# Patient Record
Sex: MALE | Race: WHITE | NOT HISPANIC OR LATINO | URBAN - METROPOLITAN AREA
[De-identification: names, ages, dates, MRNs, and addresses within clinical notes are randomized per-mention and may not be internally consistent; named-entity substitution may affect disease eponyms.]

---

## 2018-09-27 ENCOUNTER — DOCUMENTATION ONLY (OUTPATIENT)
Dept: ENDOCRINOLOGY | Facility: CLINIC | Age: 33
End: 2018-09-27

## 2018-09-27 ENCOUNTER — OFFICE VISIT (OUTPATIENT)
Dept: ENDOCRINOLOGY | Facility: CLINIC | Age: 33
End: 2018-09-27
Payer: COMMERCIAL

## 2018-09-27 VITALS
WEIGHT: 171.2 LBS | DIASTOLIC BLOOD PRESSURE: 85 MMHG | SYSTOLIC BLOOD PRESSURE: 122 MMHG | BODY MASS INDEX: 24.56 KG/M2 | HEART RATE: 75 BPM

## 2018-09-27 DIAGNOSIS — E72.4 OTC (ORNITHINE TRANSCARBAMYLASE DEFICIENCY) (H): Primary | ICD-10-CM

## 2018-09-27 DIAGNOSIS — E72.4 OTC (ORNITHINE TRANSCARBAMYLASE DEFICIENCY) (H): ICD-10-CM

## 2018-09-27 LAB
ALBUMIN SERPL-MCNC: 4 G/DL (ref 3.4–5)
ALP SERPL-CCNC: 123 U/L (ref 40–150)
ALT SERPL W P-5'-P-CCNC: 54 U/L (ref 0–70)
AST SERPL W P-5'-P-CCNC: 9 U/L (ref 0–45)
BILIRUB DIRECT SERPL-MCNC: 0.2 MG/DL (ref 0–0.2)
BILIRUB SERPL-MCNC: 0.4 MG/DL (ref 0.2–1.3)
PREALB SERPL IA-MCNC: 32 MG/DL (ref 15–45)
PROT SERPL-MCNC: 8 G/DL (ref 6.8–8.8)
TRANSFERRIN SERPL-MCNC: 300 MG/DL (ref 210–360)

## 2018-09-27 PROCEDURE — 84466 ASSAY OF TRANSFERRIN: CPT | Performed by: MEDICAL GENETICS

## 2018-09-27 PROCEDURE — 83921 ORGANIC ACID SINGLE QUANT: CPT | Performed by: MEDICAL GENETICS

## 2018-09-27 PROCEDURE — 82139 AMINO ACIDS QUAN 6 OR MORE: CPT | Performed by: MEDICAL GENETICS

## 2018-09-27 PROCEDURE — 82306 VITAMIN D 25 HYDROXY: CPT | Performed by: MEDICAL GENETICS

## 2018-09-27 RX ORDER — FAMOTIDINE 40 MG/1
40 TABLET, FILM COATED ORAL 2 TIMES DAILY
COMMUNITY
Start: 2018-05-14

## 2018-09-27 ASSESSMENT — PAIN SCALES - GENERAL: PAINLEVEL: NO PAIN (0)

## 2018-09-27 NOTE — PROGRESS NOTES
Adult Metabolism Clinic Return Visit  Name:  Tory Stone  :   1985  MRN:   [id number]  Date of Visit: Sep 27, 2018  PCP: Sterling Pereira  Managing Metabolic Center(s):  [facility] Adult Metabolism [facility].    Chief Complaint:  Mr. Tory Stone is a 33 year old male whom I saw in follow up in Metabolism Clinic for OTC deficiencyfiance.  Mr. 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 Fortunately, he has not had an episode of significant metabolic decompensation for many years. Nonetheless, he remains at risk for hyperammonemia should he fall seriously ill or have significant physical stress. For this reason, he should make sure that his medical providers are aware of his condition and keep a copy of his emergency letter should symptoms ensue.     His lab tests suggest against protein insufficiency with likely adequate protein intake on his current diet.     Plan:    1. Testing ordered at this visit:   Orders Placed This Encounter   Procedures     Amino acids plasma quantitative     Prealbumin     Transferrin     Vitamin D Deficiency     Orotic acid urine     Hepatic panel   .    Results are as noted, below. Additional recommendations based on these laboratory results:  Glutamine was normal suggesting no chronic hyperammonemia is present. Hepatic proteins as a measure of protein intake adequacy showed a normal profile of proteins suggesting general protein sufficiency.  Vitamin D was normal. Liver enzyme tests were normal. Urine orotic acid was normal    2. Medications:  No current medical treatments are required for ongoing management of Hu's OTC deficiency at this time.  3. Continue to observe emergency precautions as previously discussed.  Our on-call metabolic service is available 24 hours/day by calling the page  ([phone]) and asking for the Genetics and Metabolism doctor on  "call.  4. Return to the Adult Metabolism Clinic in 12 months for follow-up.     5.   May try Zyrtec or Benadryl for itching  6.  I recommended that if he experiences a similar \"dazed\" episode that he get his ammonia checked.  ----------  History of Present Illness:  Visit Diagnosis:  OTC (ornithine transcarbamylase deficiency) (H)    Patient Active Problem List   Diagnosis     OTC (ornithine transcarbamylase deficiency) - see EMERGENCY protocol in letters tab dated 5/14/14     Need for immunization against influenza     Discussed at this visit:   Hu reported a single episode when he felt \"very out of it\" or dazed. He did not seek particular medical attention at that time and this remitted without intervention. On the day of the visit, he was having some upper respiratory symptoms and some pain in his right ear.    Nutrition History:  He describes himself as \"going with his gut\" with regard to his diet. He does not formally restrict protein intake. He avoids muscle meat but eats occasional fish and sparingly eats eggs. He drinks some milk and eats some cheese..      Interval History:  Tory was last seen in our clinic on  1/25/16.  Since the last clinic visit Tory was not seen for any urgent clinic visits.  He was not seen in the emergency department.  He currently has a written emergency letter for this condition.  No hospitalizations occurred.  He had no general anesthesia and no surgical procedures.      Other health services currently received are primary care    Personal History:  Family History Update:  There was no new family history information elicited at this visit.    Lives with flora. His mother has moved to [place]. He is unable to work because of his chronic fatigue. He indicated to me that he recently has been working with a new  to obtain some additional support.    Current insurance status state/federal program (Medicaid/Medicare).      I have reviewed Tory s past " "medical history, family history, social history, medications and allergies as documented in the patient's electronic medical record.  There were no additional findings except as noted.     Review of Systems:   Constitional: chronic fatigue, sleeps 10-13 hours a day  Eyes: negative -  Wears glasses as he is nearsighted   Ears/Nose/Throat: negative - normal hearing  Respiratory:  Tires easily with exertion and gets \"winded\"  Cardiovascular: negative  Gastrointestinal:  Has some problems with reflux and occasionally takes antacids; does not have constipation  Genitourinary:  Has had kidney stones that required lithotripsy; he indicates that occasionally he has aching in the kidney area  Hematologic/Lymphatic: negative  Allergy/Immunologic: negative - no drug allergies  Musculoskeletal: negative  Endocrine: negative  Integument: negative  Neurologic:  Has recurrent migraines; also notes some hypersensitivity to touch at certain times. He treats things with over-the-counter NSAIDs. These occur a few times a month.  Psychiatric:  Anxiety    Medications:  Current Outpatient Prescriptions   Medication Sig Dispense Refill     famotidine (PEPCID) 40 MG tablet Take 40 mg by mouth 2 times daily       Multiple Vitamins-Minerals (MULTIVITAMIN PO) Take 1 tablet by mouth daily        Allergies:  Allergies   Allergen Reactions     Sulfa Drugs      Physical Examination:  Blood pressure 122/85, pulse 75, weight 77.7 kg (171 lb 3.2 oz).  Body surface area is 1.96 meters squared.    General: This was a well-developed, well-nourished male who responded appropriately to all requests during the examination.    Head and Neck:  He had hair of normal texture and distribution and his head was proportionate in appearance.The neck was supple without lymphadenopathy.    Eyes:  The pupils were equal, round, and reacted to light.   The conjunctivae were clear.   Ears:  His ears were normal in architecture and placement.   Nose: The nose was clear.  "   Mouth and Throat: his dentition was normal.  The throat was without erythema.    Respiratory: The chest was clear to auscultation and had a symmetric appearance.    CV:  On examination of the heart, the rhythm was regular and there was no murmur.  The peripheral pulses were normal.    GI: The abdomen was soft and had normal bowel sounds.  There was no hepatosplenomegaly.    : I deferred a  examination.   Musculoskeletal: There was a full range of motion on the extremity exam, and normal muscular volume and bulk.   Neurologic: The neurologic exam was normal, with normal cranial nerves, normal deep tendon reflexes, normal sensation, and a normal gait. He had normal tone.   Integument: The skin was normal with no rashes or unusual pigmentation.    Results of laboratory studies collected at this visit and available when this note was completed:   Orders Only on 09/27/2018   Component Date Value Ref Range Status     A-Aminoadipic 09/27/2018 Negative  0 - 6 umol/dL Final     Alanine 09/27/2018 44  22 - 62 umol/dL Final     Anserine 09/27/2018 Negative  umol/dL Final     Arginine 09/27/2018 6  2 - 18 umol/dL Final     Asparagine 09/27/2018 5  1 - 5 umol/dL Final     Aspartic Acid 09/27/2018 <1  0 - 4 umol/dL Final     B-Alanine Plasma 09/27/2018 Negative  umol/dL Final     B-Aminoisobutyric 09/27/2018 Negative  umol/dL Final     Carnosine 09/27/2018 Negative  umol/dL Final     Citrulline 09/27/2018 2.1  1.3 - 6.0 umol/dL Final    Comment: Citrulline Reference Ranges in umol/dL:  0-4 months              0.7-4.1  4 months-12 years       1.0-5.0  >12 years               1.3-6.0       Cystathionine 09/27/2018 Negative  umol/dL Final     Cystine 09/27/2018 8  3 - 15 umol/dL Final     Glutamic Acid 09/27/2018 5  0 - 16 umol/dL Final     Glutamine 09/27/2018 64  41 - 86 umol/dL Final     Glycine 09/27/2018 25  13 - 50 umol/dL Final     Histidine 09/27/2018 7  3 - 15 umol/dL Final     1-Methylhistidine 09/27/2018 3* 0 - 2  umol/dL Final     3-Methylhistidine 09/27/2018 <1  0 - 1 umol/dL Final     Homocysteine umol/dL 09/27/2018 Negative  umol/dL Final     Hydroxylysine 09/27/2018 Negative  umol/dL Final     Hydroxyproline 09/27/2018 1  0 - 3 umol/dL Final     Isoleucine 09/27/2018 6  4 - 11 umol/dL Final     Leucine 09/27/2018 10  8 - 21 umol/dL Final     Lysine 09/27/2018 16  6 - 26 umol/dL Final     Methionine 09/27/2018 2  1 - 6 umol/dL Final     Ornithine 09/27/2018 5  2 - 16 umol/dL Final     Phenylalanine umol/dL 09/27/2018 5  3 - 10 umol/dL Final     Proline 09/27/2018 20  0 - 48 umol/dL Final     Sarcosine Plasma 09/27/2018 Negative  umol/dL Final     Serine 09/27/2018 9  4 - 18 umol/dL Final     Taurine 09/27/2018 8  7 - 32 umol/dL Final     Threonine 09/27/2018 14  5 - 25 umol/dL Final     Tyrosine 09/27/2018 6  4 - 13 umol/dL Final     Valine 09/27/2018 19  8 - 46 umol/dL Final     Prealbumin 09/27/2018 32  15 - 45 mg/dL Final     Transferrin 09/27/2018 300  210 - 360 mg/dL Final     Vitamin D Deficiency screening 09/27/2018 25  20 - 75 ug/L Final     Bilirubin Direct 09/27/2018 0.2  0.0 - 0.2 mg/dL Final     Bilirubin Total 09/27/2018 0.4  0.2 - 1.3 mg/dL Final     Albumin 09/27/2018 4.0  3.4 - 5.0 g/dL Final     Protein Total 09/27/2018 8.0  6.8 - 8.8 g/dL Final     Alkaline Phosphatase 09/27/2018 123  40 - 150 U/L Final     ALT 09/27/2018 54  0 - 70 U/L Final     AST 09/27/2018 9  0 - 45 U/L Final    Test name               Result      Flag  Units    RefIntvl  ------------------------------------------------------------  Creatinine, Urine - per volume                                        287              mg/dL             Orotic Acid                     0.3              mmol/mol     CRT 0.2-1.5  Orotic Acid, Urine - Interpretation  Normal                                     CHAN HERNANDEZ M.D.  Professor and Director   Division of Genetics and Metabolism  Department of Pediatrics  [facility]    Routed to  patient in [facility]  Also to Sterling Pereira

## 2018-09-27 NOTE — LETTER
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 Due to the metabolic defect, he has a defective ability to produce urea from ammonia. Therefore, he is susceptible to hyperammonemia. Because of the metabolic block, vomiting, lethargy, coma and possibly death could occur. Signs and symptoms of hyperammonemia could include, but are not limited to, severe headache, abdominal pain, vomiting, diarrhea, extreme sleepiness or lack of energy, slurred speech, poor coordination or balance problems, behavior or personality changes and confusion.     Tory is at great risk of medical emergency under the following circumstances. Tory is especially vulnerable to acute exacerbations with poor oral intake, prolonged fasting, and severe body stresses such as dehydration, fever, viral or bacterial illnesses, surgery, or a severe catabolic state or high protein intake.      This letter is not exhaustive and is not a substitute for contact with the Genetics and Metabolism physician on call available 24 hours/day via the page  ([phone]).  Please initiate the protocol below and contact us immediately.      Acute Treatment:    Continue medications as prescribed.    During any acute illness, protein intake should be reduced to a minimum or eliminated for 24 hours and sugar-containing liquids in increased amounts should be administered.    Room Laurance immediately and start an IV.    D10 1/2NS at 1 1/2 times maintenance with appropriate electrolytes. If dehydrated do not wait to complete a bolus to start D10; add saline bolus parallel to D10 infusion.    If no enteral intake estimated > 12 hours or if this is anticipated to occur, start IV lipids @ 2 gms/kg/day (peripheral line  adequate).    Ammonia levels should be monitored closely.  Specific ammonia detoxifying medications (sodium benzoate and sodium phenylacetate) may be required for treatment of a severe episode.  These medications are not generally available, except at a tertiary care center.      Evaluate and aggressively treat precipitating event.    If Tory does not respond to above intervention, more intensive management may be required; transfer to tertiary care may be indicated.    Pre-coordination with Metabolism is needed if surgery/anesthesia is required.    The use of steroids can precipitate hyperammonemia and should be used only if the need to treat airway symptoms is urgent. Please consult Metabolism for assistance in management if steroid use is contemplated.    Immediate Laboratory Studies to Order:    Blood glucose, electrolytes, liver function tests    Ammonia    CC  TIM HARE    Copy to patient  Tory Stone  [address] [facility] [address]

## 2018-09-27 NOTE — LETTER
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 Therefore, he is susceptible to hyperammonemia. Because of the metabolic block, vomiting, lethargy, coma and possibly death could occur. Signs and symptoms of hyperammonemia could include, but are not limited to, severe headache, abdominal pain, vomiting, diarrhea, extreme sleepiness or lack of energy, slurred speech, poor coordination or balance problems, behavior or personality changes and confusion.     Tory is at great risk of medical emergency under the following circumstances. Tory is especially vulnerable to acute exacerbations with poor oral intake, prolonged fasting, and severe body stresses such as dehydration, fever, viral or bacterial illnesses, surgery, or a severe catabolic state or high protein intake.      This letter is not exhaustive and is not a substitute for contact with the Genetics and Metabolism physician on call available 24 hours/day via the page  ([phone]).  Please initiate the protocol below and contact us immediately.      Acute Treatment:    Continue medications as prescribed.    During any acute illness, protein intake should be reduced to a minimum or eliminated for 24 hours and sugar-containing liquids in increased amounts should be administered.    Room Laurance immediately and start an IV.    D10 1/2NS at 1 1/2 times maintenance with appropriate electrolytes. If dehydrated do not wait to complete a bolus to start D10; add saline bolus parallel to D10 infusion.    If no enteral intake estimated > 12 hours or if this is anticipated to occur, start IV lipids @ 2 gms/kg/day (peripheral line  adequate).    Ammonia levels should be monitored closely.  Specific ammonia detoxifying medications (sodium benzoate and sodium phenylacetate) may be required for treatment of a severe episode.  These medications are not generally available, except at a tertiary care center.      Evaluate and aggressively treat precipitating event.    If Tory does not respond to above intervention, more intensive management may be required; transfer to tertiary care may be indicated.    Pre-coordination with Metabolism is needed if surgery/anesthesia is required.    Immediate Laboratory Studies to Order:    Blood glucose, electrolytes, liver function tests    Ammonia    cc: Tory Stone   [address] [facility] [address]   cc: Bony Malcolm   [facility] [address] [facility] [id number]

## 2018-09-27 NOTE — PATIENT INSTRUCTIONS
Metabolism Clinic    Maintain metabolic diet and medications.  Call if any general care questions arise - contact our nurse coordinator Khushboo Gipson at [phone].      Contact the metabolic doctor on-call if any immediate need because of illness - [phone]

## 2018-09-27 NOTE — PROGRESS NOTES
9/27/2018 @ 1:15pm-        Reviewed opportunity for participation in Urea Cycle Disorders Consortium Longitudinal study. Reviewed that patient could participate either by active participation where information would be collected from his annual Adult Metabolism visits or he could opt to participate in medical record review only where participation would be at an isolated time point for medical record review regarding his OTC deficiency. Patient declined participation in both active participation, as well as medical record review. Indicated to patient should he change his mind at any time in the future, he could certainly do so. Patient verbalized understanding.

## 2018-09-27 NOTE — LETTER
2018    RE: Tory Stone  [address] [facility] [address]     Adult Metabolism Clinic Return Visit  Name:  Tory Stone  :   1985  MRN:   [id number]  Date of Visit: Sep 27, 2018  PCP: Sterling Pereira  Managing Metabolic Center(s):  [facility] Adult Metabolism [facility].    Chief Complaint:  Mr. Tory Stone is a 33 year old male whom I saw in follow up in Metabolism Clinic for OTC deficiencyfiance.  Mr. 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 Fortunately, he has not had an episode of significant metabolic decompensation for many years. Nonetheless, he remains at risk for hyperammonemia should he fall seriously ill or have significant physical stress. For this reason, he should make sure that his medical providers are aware of his condition and keep a copy of his emergency letter should symptoms ensue.     His lab tests suggest against protein insufficiency with likely adequate protein intake on his current diet.     Plan:    1. Testing ordered at this visit:   Orders Placed This Encounter   Procedures     Amino acids plasma quantitative     Prealbumin     Transferrin     Vitamin D Deficiency     Orotic acid urine     Hepatic panel   .    Results are as noted, below. Additional recommendations based on these laboratory results:  Glutamine was normal suggesting no chronic hyperammonemia is present. Hepatic proteins as a measure of protein intake adequacy showed a normal profile of proteins suggesting general protein sufficiency.  Vitamin D was normal. Liver enzyme tests were normal. Urine orotic acid was normal    2. Medications:  No current medical treatments are required for ongoing management of Hu's OTC deficiency at this time.  3. Continue to observe emergency precautions as previously discussed.  Our on-call metabolic service is available 24 hours/day by calling the page  " ([phone]) and asking for the Genetics and Metabolism doctor on call.  4. Return to the Adult Metabolism Clinic in 12 months for follow-up.     5.   May try Zyrtec or Benadryl for itching  6.  I recommended that if he experiences a similar \"dazed\" episode that he get his ammonia checked.  ----------  History of Present Illness:  Visit Diagnosis:  OTC (ornithine transcarbamylase deficiency) (H)    Patient Active Problem List   Diagnosis     OTC (ornithine transcarbamylase deficiency) - see EMERGENCY protocol in letters tab dated 5/14/14     Need for immunization against influenza     Discussed at this visit:   Hu reported a single episode when he felt \"very out of it\" or dazed. He did not seek particular medical attention at that time and this remitted without intervention. On the day of the visit, he was having some upper respiratory symptoms and some pain in his right ear.    Nutrition History:  He describes himself as \"going with his gut\" with regard to his diet. He does not formally restrict protein intake. He avoids muscle meat but eats occasional fish and sparingly eats eggs. He drinks some milk and eats some cheese..      Interval History:  Tory was last seen in our clinic on  1/25/16.  Since the last clinic visit Tory was not seen for any urgent clinic visits.  He was not seen in the emergency department.  He currently has a written emergency letter for this condition.  No hospitalizations occurred.  He had no general anesthesia and no surgical procedures.      Other health services currently received are primary care    Personal History:  Family History Update:  There was no new family history information elicited at this visit.    Lives with flora. His mother has moved to [place]. He is unable to work because of his chronic fatigue. He indicated to me that he recently has been working with a new  to obtain some additional support.    Current insurance status " "state/federal program (Medicaid/Medicare).      I have reviewed Tory s past medical history, family history, social history, medications and allergies as documented in the patient's electronic medical record.  There were no additional findings except as noted.     Review of Systems:   Constitional: chronic fatigue, sleeps 10-13 hours a day  Eyes: negative -  Wears glasses as he is nearsighted   Ears/Nose/Throat: negative - normal hearing  Respiratory:  Tires easily with exertion and gets \"winded\"  Cardiovascular: negative  Gastrointestinal:  Has some problems with reflux and occasionally takes antacids; does not have constipation  Genitourinary:  Has had kidney stones that required lithotripsy; he indicates that occasionally he has aching in the kidney area  Hematologic/Lymphatic: negative  Allergy/Immunologic: negative - no drug allergies  Musculoskeletal: negative  Endocrine: negative  Integument: negative  Neurologic:  Has recurrent migraines; also notes some hypersensitivity to touch at certain times. He treats things with over-the-counter NSAIDs. These occur a few times a month.  Psychiatric:  Anxiety    Medications:  Current Outpatient Prescriptions   Medication Sig Dispense Refill     famotidine (PEPCID) 40 MG tablet Take 40 mg by mouth 2 times daily       Multiple Vitamins-Minerals (MULTIVITAMIN PO) Take 1 tablet by mouth daily        Allergies:  Allergies   Allergen Reactions     Sulfa Drugs      Physical Examination:  Blood pressure 122/85, pulse 75, weight 77.7 kg (171 lb 3.2 oz).  Body surface area is 1.96 meters squared.    General: This was a well-developed, well-nourished male who responded appropriately to all requests during the examination.    Head and Neck:  He had hair of normal texture and distribution and his head was proportionate in appearance.The neck was supple without lymphadenopathy.    Eyes:  The pupils were equal, round, and reacted to light.   The conjunctivae were clear.   Ears:  " His ears were normal in architecture and placement.   Nose: The nose was clear.    Mouth and Throat: his dentition was normal.  The throat was without erythema.    Respiratory: The chest was clear to auscultation and had a symmetric appearance.    CV:  On examination of the heart, the rhythm was regular and there was no murmur.  The peripheral pulses were normal.    GI: The abdomen was soft and had normal bowel sounds.  There was no hepatosplenomegaly.    : I deferred a  examination.   Musculoskeletal: There was a full range of motion on the extremity exam, and normal muscular volume and bulk.   Neurologic: The neurologic exam was normal, with normal cranial nerves, normal deep tendon reflexes, normal sensation, and a normal gait. He had normal tone.   Integument: The skin was normal with no rashes or unusual pigmentation.    Results of laboratory studies collected at this visit and available when this note was completed:   Orders Only on 09/27/2018   Component Date Value Ref Range Status     A-Aminoadipic 09/27/2018 Negative  0 - 6 umol/dL Final     Alanine 09/27/2018 44  22 - 62 umol/dL Final     Anserine 09/27/2018 Negative  umol/dL Final     Arginine 09/27/2018 6  2 - 18 umol/dL Final     Asparagine 09/27/2018 5  1 - 5 umol/dL Final     Aspartic Acid 09/27/2018 <1  0 - 4 umol/dL Final     B-Alanine Plasma 09/27/2018 Negative  umol/dL Final     B-Aminoisobutyric 09/27/2018 Negative  umol/dL Final     Carnosine 09/27/2018 Negative  umol/dL Final     Citrulline 09/27/2018 2.1  1.3 - 6.0 umol/dL Final    Comment: Citrulline Reference Ranges in umol/dL:  0-4 months              0.7-4.1  4 months-12 years       1.0-5.0  >12 years               1.3-6.0       Cystathionine 09/27/2018 Negative  umol/dL Final     Cystine 09/27/2018 8  3 - 15 umol/dL Final     Glutamic Acid 09/27/2018 5  0 - 16 umol/dL Final     Glutamine 09/27/2018 64  41 - 86 umol/dL Final     Glycine 09/27/2018 25  13 - 50 umol/dL Final      Histidine 09/27/2018 7  3 - 15 umol/dL Final     1-Methylhistidine 09/27/2018 3* 0 - 2 umol/dL Final     3-Methylhistidine 09/27/2018 <1  0 - 1 umol/dL Final     Homocysteine umol/dL 09/27/2018 Negative  umol/dL Final     Hydroxylysine 09/27/2018 Negative  umol/dL Final     Hydroxyproline 09/27/2018 1  0 - 3 umol/dL Final     Isoleucine 09/27/2018 6  4 - 11 umol/dL Final     Leucine 09/27/2018 10  8 - 21 umol/dL Final     Lysine 09/27/2018 16  6 - 26 umol/dL Final     Methionine 09/27/2018 2  1 - 6 umol/dL Final     Ornithine 09/27/2018 5  2 - 16 umol/dL Final     Phenylalanine umol/dL 09/27/2018 5  3 - 10 umol/dL Final     Proline 09/27/2018 20  0 - 48 umol/dL Final     Sarcosine Plasma 09/27/2018 Negative  umol/dL Final     Serine 09/27/2018 9  4 - 18 umol/dL Final     Taurine 09/27/2018 8  7 - 32 umol/dL Final     Threonine 09/27/2018 14  5 - 25 umol/dL Final     Tyrosine 09/27/2018 6  4 - 13 umol/dL Final     Valine 09/27/2018 19  8 - 46 umol/dL Final     Prealbumin 09/27/2018 32  15 - 45 mg/dL Final     Transferrin 09/27/2018 300  210 - 360 mg/dL Final     Vitamin D Deficiency screening 09/27/2018 25  20 - 75 ug/L Final     Bilirubin Direct 09/27/2018 0.2  0.0 - 0.2 mg/dL Final     Bilirubin Total 09/27/2018 0.4  0.2 - 1.3 mg/dL Final     Albumin 09/27/2018 4.0  3.4 - 5.0 g/dL Final     Protein Total 09/27/2018 8.0  6.8 - 8.8 g/dL Final     Alkaline Phosphatase 09/27/2018 123  40 - 150 U/L Final     ALT 09/27/2018 54  0 - 70 U/L Final     AST 09/27/2018 9  0 - 45 U/L Final    Test name               Result      Flag  Units    RefIntvl  ------------------------------------------------------------  Creatinine, Urine - per volume                                        287              mg/dL             Orotic Acid                     0.3              mmol/mol     CRT 0.2-1.5  Orotic Acid, Urine - Interpretation  Normal                                     CHAN HERNANDEZ M.D.  Professor and Director   Division of  Genetics and Metabolism  Department of Pediatrics  [facility]    Routed to patient in Comm Mgt  Also to Sterling Pereira

## 2018-09-27 NOTE — MR AVS SNAPSHOT
After Visit Summary   9/27/2018    Tory Stone    MRN: [id number]           Patient Information     Date Of Birth          1985        Visit Information        Provider Department      9/27/2018 12:15 PM Diane Benton MD  Health Metabolic Disorders        Today's Diagnoses     OTC (ornithine transcarbamylase deficiency) - see EMERGENCY protocol in letters tab dated 5/14/14    -  1      Care Instructions    Metabolism Clinic    Maintain metabolic diet and medications.  Call if any general care questions arise - contact our nurse coordinator Khushboo Gipson at [phone].      Contact the metabolic doctor on-call if any immediate need because of illness - [phone]            Follow-ups after your visit        Follow-up notes from your care team     Return in about 1 year (around 9/27/2019).      Who to contact     Please call your clinic at [phone] to:    Ask questions about your health    Make or cancel appointments    Discuss your medicines    Learn about your test results    Speak to your doctor            Additional Information About Your Visit        [employer] Information     [employer] gives you secure access to your electronic health record. If you see a primary care provider, you can also send messages to your care team and make appointments. If you have questions, please call your primary care clinic.  If you do not have a primary care provider, please call [phone] and they will assist you.      [employer] is an electronic gateway that provides easy, online access to your medical records. With [employer], you can request a clinic appointment, read your test results, renew a prescription or communicate with your care team.     To access your existing account, please contact your [facility] or call [phone] for assistance.        Care EveryWhere ID     This is your Care EveryWhere ID. This could be used by other organizations to access your  [facility] medical records  [id number]        Your Vitals Were     Pulse BMI (Body Mass Index)                75 24.56 kg/m2           Blood Pressure from Last 3 Encounters:   09/27/18 122/85   01/25/16 (!) 134/91   07/27/15 124/80    Weight from Last 3 Encounters:   09/27/18 171 lb 3.2 oz (77.7 kg)   01/25/16 171 lb (77.6 kg)   07/27/15 166 lb 9.6 oz (75.6 kg)               Primary Care Provider Office Phone # Fax #    Bony Malcolm [phone]       [facility] [address] [facility] [address]        Equal Access to Services     SHANI SALAMANCA : Hadii aad ku hadasho Soanaali, waaxda luqadaha, qaybta kaalmada adeegyada, kamlesh kumar. So [facility] [phone].    ATENCIÓN: Si habla español, tiene a carrillo disposición servicios gratuitos de asistencia lingüística. [facility] [phone].    We comply with applicable federal civil rights laws and [place] laws. We do not discriminate on the basis of race, color, national origin, age, disability, sex, sexual orientation, or gender identity.            Thank you!     Thank you for choosing [facility] METABOLIC DISORDERS  for your care. Our goal is always to provide you with excellent care. Hearing back from our patients is one way we can continue to improve our services. Please take a few minutes to complete the written survey that you may receive in the mail after your visit with us. Thank you!             Your Updated Medication List - Protect others around you: Learn how to safely use, store and throw away your medicines at www.disposemymeds.org.          This list is accurate as of 9/27/18 11:59 PM.  Always use your most recent med list.                   Brand Name Dispense Instructions for use Diagnosis    famotidine 40 MG tablet    PEPCID     Take 40 mg by mouth 2 times daily    OTC (ornithine transcarbamylase deficiency) (H)       MULTIVITAMIN PO      Take 1 tablet by mouth daily

## 2018-09-28 LAB — DEPRECATED CALCIDIOL+CALCIFEROL SERPL-MC: 25 UG/L (ref 20–75)

## 2018-10-01 LAB
(HCYS)2 SERPL-SCNC: NEGATIVE UMOL/DL
1ME-HIST SERPL-SCNC: 3 UMOL/DL (ref 0–2)
3ME-HISTIDINE SERPL-SCNC: <1 UMOL/DL (ref 0–1)
AAA SERPL-SCNC: NEGATIVE UMOL/DL (ref 0–6)
ALANINE SERPL-SCNC: NEGATIVE UMOL/DL
ALANINE SFR SERPL: 44 UMOL/DL (ref 22–62)
ANSERINE SERPL-SCNC: NEGATIVE UMOL/DL
ARGININE SERPL-SCNC: 6 UMOL/DL (ref 2–18)
ASPARAGINE SERPL-SCNC: 5 UMOL/DL (ref 1–5)
ASPARTATE SERPL-SCNC: <1 UMOL/DL (ref 0–4)
B-AIB SERPL-SCNC: NEGATIVE UMOL/DL
CARNOSINE SERPL-SCNC: NEGATIVE UMOL/DL
CITRULLINE SERPL-SCNC: 2.1 UMOL/DL (ref 1.3–6)
CYSTATHIONIN SERPL-SCNC: NEGATIVE UMOL/DL
CYSTINE SERPL-SCNC: 8 UMOL/DL (ref 3–15)
GLUTAMATE SERPL-SCNC: 5 UMOL/DL (ref 0–16)
GLUTAMATE SERPL-SCNC: 64 UMOL/DL (ref 41–86)
GLYCINE SERPL-SCNC: 25 UMOL/DL (ref 13–50)
HISTIDINE SERPL-SCNC: 7 UMOL/DL (ref 3–15)
ISOLEUCINE SERPL-SCNC: 6 UMOL/DL (ref 4–11)
LEUCINE SERPL-SCNC: 10 UMOL/DL (ref 8–21)
LYSINE SERPL-SCNC: 16 UMOL/DL (ref 6–26)
METHIONINE SERPL-SCNC: 2 UMOL/DL (ref 1–6)
OH-LYSINE SERPL-SCNC: NEGATIVE UMOL/DL
OH-PROLINE SERPL-SCNC: 1 UMOL/DL (ref 0–3)
ORNITHINE SERPL-SCNC: 5 UMOL/DL (ref 2–16)
PHE SERPL-SCNC: 5 UMOL/DL (ref 3–10)
PROLINE SERPL-SCNC: 20 UMOL/DL (ref 0–48)
SARCOSINE SERPL-SCNC: NEGATIVE UMOL/DL
SERINE SERPL-SCNC: 9 UMOL/DL (ref 4–18)
TAURINE SERPL-SCNC: 8 UMOL/DL (ref 7–32)
THREONINE SERPL-SCNC: 14 UMOL/DL (ref 5–25)
TYROSINE SERPL-SCNC: 6 UMOL/DL (ref 4–13)
VALINE SERPL-SCNC: 19 UMOL/DL (ref 8–46)

## 2018-10-05 LAB
RESULT: NORMAL
SEND OUTS MISC TEST CODE: NORMAL
SEND OUTS MISC TEST SPECIMEN: NORMAL
TEST NAME: NORMAL

## 2019-10-04 ENCOUNTER — HEALTH MAINTENANCE LETTER (OUTPATIENT)
Age: 34
End: 2019-10-04

## 2020-09-24 ENCOUNTER — TELEPHONE (OUTPATIENT)
Dept: ENDOCRINOLOGY | Facility: CLINIC | Age: 35
End: 2020-09-24

## 2020-09-24 ENCOUNTER — VIRTUAL VISIT (OUTPATIENT)
Dept: ENDOCRINOLOGY | Facility: CLINIC | Age: 35
End: 2020-09-24
Payer: COMMERCIAL

## 2020-09-24 DIAGNOSIS — E72.4 OTC (ORNITHINE TRANSCARBAMYLASE DEFICIENCY) (H): Primary | ICD-10-CM

## 2020-09-24 NOTE — PATIENT INSTRUCTIONS
Adult Metabolism Clinic  [facility]     If questions/concerns, feel free to reach our nurse coordinator at the below number or send a [employer] message for any non-urgent questions/concerns.    If any immediate needs because of illness or symptoms, contact the Pediatric Genetic/Metabolic physician on-call via [phone].    Team contact numbers:   RN Care Coordinator: [phone]  Jennifer Oneil RD, LD (dietitian): [phone] or [email or web address]  Genetic/Metabolic Physician On-call: [phone]     Scheduling numbers:  General scheduling (Adult Call Center): [phone]

## 2020-09-24 NOTE — Clinical Note
9/24/2020       RE: Tory Stone  [address] [facility] [address]     Dear Colleague,    Thank you for referring your patient, Tory Stone, to the [facility] METABOLIC DISORDERS at [facility]. Please see a copy of my visit note below.    No notes on file    Again, thank you for allowing me to participate in the care of your patient.      Sincerely,    Diane Benton MD

## 2020-09-24 NOTE — LETTER
2020    RE: Tory Stone  [address] [facility] [address]     Metabolism Clinic Return Patient Video Visit  Name:  Tory Stone  :   1985  MRN:   [id number]  Referring Provider:  Bony Malcolm  PCP:  Bony Malcolm  Date of Visit: Sep 24, 2020    Managing Metabolic Center:  [facility] Metabolism Clinic     Chief Complaint:  Tory is a 35 year old male whom I saw in follow up via video visit for OTC (ornithine transcarbamylase deficiency). Tory was accompanied during this visit by his fiancée.     Assessment:     Mr. Stone has not had any episodes that suggest metabolic decompensation in the recent past. He remains stable on no medical therapies for his OTC deficiency.    Plan:    1. Ordered at this visit:  No orders of the defined types were placed in this encounter.   Mr. Stone was uncomfortable with the prospect of going out to have labs drawn. Because of this, we elected to see him back in clinic sooner to make sure that his course remains stable.    2. Medications:  Does not take any medications specific to his OTC deficiency  3. Continue to observe emergency precautions as previously discussed.  Our on-call metabolic service is available 24 hours/day by calling the page  ([phone]) and asking for the Genetics and Metabolism doctor on call.     He would like an updated emergency letter with extra copies mailed  4. Return to the Pediatric Metabolism Clinic in 3 months for follow-up.      ___________  History of Present Illness:  Visit Diagnosis:  OTC (ornithine transcarbamylase deficiency) (H)    Patient Active Problem List   Diagnosis     OTC (ornithine transcarbamylase deficiency) - see EMERGENCY protocol in letters tab dated 14     Need for immunization against influenza     Interval information noted at this visit:   Mr. Ritter felt things overall were going generally okay. He has not had any intervening illnesses and had not  "required any hospital visits. He has chronic issues with kidney stones but these are currently generally under control.    He reported that he underwent therapy for about 18 months to work on his anxiety. This was effective but ended when his provider moved. They mutually agreed that he could try going without therapy to see how things went as he had been successful to that point.     Mr. Stone experiences relatively frequent migraines but these have been somewhat less frequent recently. These occur about 3-4 times a month and are accompanied by photophobia. He typically takes Motrin for relief. His chronic fatigue \"comes and goes\".    Tory was last seen in our clinic on  9/27/2018.  Since the last clinic visit Tory was not seen for any urgent clinic visits.  He was not seen in the emergency department.  He currently knows how to contact the 24 hour metabolic specialist on call and has a written emergency letter for this condition.  No hospitalizations occurred.  He had no general anesthesia and no surgical procedures.      Other health services currently received: primary care         Past Medical History  Past Medical History:   Diagnosis Date     OTC (ornithine transcarbamylase deficiency) (H) 5/14/2014     Renal stones 11/24/2014     Personal History:  Lives with francisca. He reports that he has been inside at home nearly the whole time since the onset of the pandemic. This was less challenging in some ways for him than for some as he tends to be an indoors person. He reported successful at enrollment in Social Security which has helped financially.    Current insurance status state/federal program (Medicaid/Medicare).       Family History Update: There was no new family history elicited at this visit   Family History   Problem Relation Age of Onset     Genetic Disorder Mother         OTC Heterozygote     I have reviewed Tory's past medical history, family history, social history, medications and " "allergies as documented in the patient's electronic medical record.  There were no additional findings except as noted.     Nutrition History:     Mr. Stone reports his diet maintenance is based on how he feels after eating certain foods. He doesn't report any cravings. He tends to avoid red meats and particularly does not like pork. He eats some eggs and some milk with cereal. He will have an occasional cheeseburger.     Review of Systems:   Constitional: chronic fatigue  Eyes: negative -  Wears glasses as he is nearsighted   Ears/Nose/Throat: negative - normal hearing  Respiratory:  Tires easily with exertion and gets \"winded\"  Cardiovascular: negative  Gastrointestinal:  Has some problems with reflux and occasionally takes antacids; does not have constipation  Genitourinary:  Has had kidney stones that required lithotripsy; he indicates that occasionally he has aching in the kidney area  Hematologic/Lymphatic: negative  Allergy/Immunologic: negative - no drug allergies  Musculoskeletal: negative  Endocrine: negative  Integument: negative  Neurologic:  Has recurrent migraines; also notes some hypersensitivity to touch at certain times. He treats things with over-the-counter NSAIDs. These occur a few times a month.  Psychiatric:  Anxiety    Medications:  Current Outpatient Medications   Medication Sig Dispense Refill     famotidine (PEPCID) 40 MG tablet Take 40 mg by mouth 2 times daily       Multiple Vitamins-Minerals (MULTIVITAMIN PO) Take 1 tablet by mouth daily       Allergies:  Allergies   Allergen Reactions     Sulfa Drugs      Physical Examination:  GENERAL: Healthy, alert and no distress  EYES: Eyes grossly normal to inspection.  No discharge or erythema, or obvious scleral/conjunctival abnormalities.  RESP: No audible wheeze, cough, or visible cyanosis.  No visible retractions or increased work of breathing.    SKIN: Visible skin clear. No significant rash, abnormal pigmentation or lesions.  NEURO: " "Cranial nerves grossly intact.  Mentation and speech appropriate for age.  PSYCH: Mentation appears normal, affect normal/bright, judgement and insight intact, normal speech and appearance well-groomed.    ---------  CHAN HERNANDEZ M.D., FAAP, [facility]  Professor   Division of Genetics and Metabolism  Department of Pediatrics  [facility]    Routed to family in Comm Mgt  Also to  Bony Malcolm    -------  Tory Stone is a 35 year old male who is being evaluated via a billable video visit.      The patient has been notified of following:     \"This video visit will be conducted via a call between you and your physician/provider. We have found that certain health care needs can be provided without the need for an in-person physical exam.  This service lets us provide the care you need with a video conversation.  If a prescription is necessary we can send it directly to your pharmacy.  If lab work is needed we can place an order for that and you can then stop by our lab to have the test done at a later time.    Video visits are billed at different rates depending on your insurance coverage.  Please reach out to your insurance provider with any questions.    If during the course of the call the physician/provider feels a video visit is not appropriate, you will not be charged for this service.\"    Patient has given verbal consent for Video visit? Yes  How would you like to obtain your AVS? [facility]    Video-Visit Details  Type of service:  Video Visit  Video Start Time: 2:21 PM  Video End Time: 2:37 PM  Originating Location (pt. Location): Home  Distant Location (provider location):  [place] METABOLIC DISORDERS   Platform used for Video Visit: Play for [employer]    "

## 2020-09-24 NOTE — PROGRESS NOTES
Metabolism Clinic Return Patient Video Visit  Name:  Tory Stone  :   1985  MRN:   [id number]  Referring Provider:  Bony Malcolm  PCP:  Bony Malcolm  Date of Visit: Sep 24, 2020    Managing Metabolic Center:  [facility]     Chief Complaint:  Tory is a 35 year old male whom I saw in follow up via video visit for OTC (ornithine transcarbamylase deficiency). Tory was accompanied during this visit by his fiancée.     Assessment:     Mr. Stone has not had any episodes that suggest metabolic decompensation in the recent past. He remains stable on no medical therapies for his OTC deficiency.    Plan:    1. Ordered at this visit:  No orders of the defined types were placed in this encounter.   Mr. Stone was uncomfortable with the prospect of going out to have labs drawn. Because of this, we elected to see him back in clinic sooner to make sure that his course remains stable.    2. Medications:  Does not take any medications specific to his OTC deficiency  3. Continue to observe emergency precautions as previously discussed.  Our on-call metabolic service is available 24 hours/day by calling the page  ([phone]) and asking for the Genetics and Metabolism doctor on call.     He would like an updated emergency letter with extra copies mailed  4. Return to the Pediatric Metabolism Clinic in 3 months for follow-up.      ___________  History of Present Illness:  Visit Diagnosis:  OTC (ornithine transcarbamylase deficiency) (H)    Patient Active Problem List   Diagnosis     OTC (ornithine transcarbamylase deficiency) - see EMERGENCY protocol in letters tab dated 14     Need for immunization against influenza     Interval information noted at this visit:   Mr. Ritter felt things overall were going generally okay. He has not had any intervening illnesses and had not required any hospital visits. He has chronic issues with kidney stones but these  "are currently generally under control.    He reported that he underwent therapy for about 18 months to work on his anxiety. This was effective but ended when his provider moved. They mutually agreed that he could try going without therapy to see how things went as he had been successful to that point.     Mr. Stone experiences relatively frequent migraines but these have been somewhat less frequent recently. These occur about 3-4 times a month and are accompanied by photophobia. He typically takes Motrin for relief. His chronic fatigue \"comes and goes\".    Tory was last seen in our clinic on  9/27/2018.  Since the last clinic visit Tory was not seen for any urgent clinic visits.  He was not seen in the emergency department.  He currently knows how to contact the 24 hour metabolic specialist on call and has a written emergency letter for this condition.  No hospitalizations occurred.  He had no general anesthesia and no surgical procedures.      Other health services currently received: primary care         Past Medical History  Past Medical History:   Diagnosis Date     OTC (ornithine transcarbamylase deficiency) (H) 5/14/2014     Renal stones 11/24/2014     Personal History:  Lives with francisca. He reports that he has been inside at home nearly the whole time since the onset of the pandemic. This was less challenging in some ways for him than for some as he tends to be an indoors person. He reported successful at enrollment in Social Security which has helped financially.    Current insurance status state/federal program (Medicaid/Medicare).       Family History Update: There was no new family history elicited at this visit   Family History   Problem Relation Age of Onset     Genetic Disorder Mother         OTC Heterozygote     I have reviewed Tory's past medical history, family history, social history, medications and allergies as documented in the patient's electronic medical record.  There were " "no additional findings except as noted.     Nutrition History:     Mr. Stone reports his diet maintenance is based on how he feels after eating certain foods. He doesn't report any cravings. He tends to avoid red meats and particularly does not like pork. He eats some eggs and some milk with cereal. He will have an occasional cheeseburger.     Review of Systems:   Constitional: chronic fatigue  Eyes: negative -  Wears glasses as he is nearsighted   Ears/Nose/Throat: negative - normal hearing  Respiratory:  Tires easily with exertion and gets \"winded\"  Cardiovascular: negative  Gastrointestinal:  Has some problems with reflux and occasionally takes antacids; does not have constipation  Genitourinary:  Has had kidney stones that required lithotripsy; he indicates that occasionally he has aching in the kidney area  Hematologic/Lymphatic: negative  Allergy/Immunologic: negative - no drug allergies  Musculoskeletal: negative  Endocrine: negative  Integument: negative  Neurologic:  Has recurrent migraines; also notes some hypersensitivity to touch at certain times. He treats things with over-the-counter NSAIDs. These occur a few times a month.  Psychiatric:  Anxiety    Medications:  Current Outpatient Medications   Medication Sig Dispense Refill     famotidine (PEPCID) 40 MG tablet Take 40 mg by mouth 2 times daily       Multiple Vitamins-Minerals (MULTIVITAMIN PO) Take 1 tablet by mouth daily       Allergies:  Allergies   Allergen Reactions     Sulfa Drugs      Physical Examination:  GENERAL: Healthy, alert and no distress  EYES: Eyes grossly normal to inspection.  No discharge or erythema, or obvious scleral/conjunctival abnormalities.  RESP: No audible wheeze, cough, or visible cyanosis.  No visible retractions or increased work of breathing.    SKIN: Visible skin clear. No significant rash, abnormal pigmentation or lesions.  NEURO: Cranial nerves grossly intact.  Mentation and speech appropriate for age.  PSYCH: " "Mentation appears normal, affect normal/bright, judgement and insight intact, normal speech and appearance well-groomed.    ---------  CHAN HERNANDEZ M.D., FAAP, [facility]  Professor   Division of Genetics and Metabolism  Department of Pediatrics  [facility]    Routed to family in Comm Mgt  Also to  Boyn Malcolm    -------  Tory Stone is a 35 year old male who is being evaluated via a billable video visit.      The patient has been notified of following:     \"This video visit will be conducted via a call between you and your physician/provider. We have found that certain health care needs can be provided without the need for an in-person physical exam.  This service lets us provide the care you need with a video conversation.  If a prescription is necessary we can send it directly to your pharmacy.  If lab work is needed we can place an order for that and you can then stop by our lab to have the test done at a later time.    Video visits are billed at different rates depending on your insurance coverage.  Please reach out to your insurance provider with any questions.    If during the course of the call the physician/provider feels a video visit is not appropriate, you will not be charged for this service.\"    Patient has given verbal consent for Video visit? Yes  How would you like to obtain your AVS? [employer]    Video-Visit Details  Type of service:  Video Visit  Video Start Time: 2:21 PM  Video End Time: 2:37 PM  Originating Location (pt. Location): Home  Distant Location (provider location):  [employer] [address]   Platform used for Video Visit: [employer]  "

## 2020-09-24 NOTE — TELEPHONE ENCOUNTER
I called Tory to offer him a genetic counseling virtual visit to review information about the genetics and inheritance of OTC deficiency and answer any questions as part of his routine follow up visit today with Dr. Diane Benton (virtual due to COVID-19). I left a message for him asking for a call back if he was interested in meeting virtually today or before his next follow up, otherwise I will again offer to meet at his next routine appointment with Dr. Benton if preferred then. I remain available if any questions or concerns arise.         Melinda Gutierrez MS, [facility]  Genetic Counseling  Genetics and Metabolism Division  [facility]   Phone: [phone]  Pager: [phone]  Email: [email or web address]

## 2020-11-08 ENCOUNTER — HEALTH MAINTENANCE LETTER (OUTPATIENT)
Age: 35
End: 2020-11-08

## 2021-01-21 ENCOUNTER — TRANSFERRED RECORDS (OUTPATIENT)
Dept: HEALTH INFORMATION MANAGEMENT | Facility: CLINIC | Age: 36
End: 2021-01-21

## 2021-09-11 ENCOUNTER — HEALTH MAINTENANCE LETTER (OUTPATIENT)
Age: 36
End: 2021-09-11

## 2022-01-01 ENCOUNTER — HEALTH MAINTENANCE LETTER (OUTPATIENT)
Age: 37
End: 2022-01-01

## 2022-10-30 ENCOUNTER — HEALTH MAINTENANCE LETTER (OUTPATIENT)
Age: 37
End: 2022-10-30

## 2022-12-28 ENCOUNTER — ALLIED HEALTH/NURSE VISIT (OUTPATIENT)
Dept: PEDIATRICS | Facility: CLINIC | Age: 37
End: 2022-12-28
Attending: MEDICAL GENETICS
Payer: COMMERCIAL

## 2022-12-28 ENCOUNTER — DOCUMENTATION ONLY (OUTPATIENT)
Dept: PEDIATRICS | Facility: CLINIC | Age: 37
End: 2022-12-28

## 2022-12-28 VITALS
DIASTOLIC BLOOD PRESSURE: 87 MMHG | BODY MASS INDEX: 24.65 KG/M2 | WEIGHT: 172.18 LBS | HEART RATE: 92 BPM | SYSTOLIC BLOOD PRESSURE: 130 MMHG | HEIGHT: 70 IN

## 2022-12-28 DIAGNOSIS — E72.4 OTC (ORNITHINE TRANSCARBAMYLASE DEFICIENCY) (H): Primary | ICD-10-CM

## 2022-12-28 DIAGNOSIS — E72.4 OTC (ORNITHINE TRANSCARBAMYLASE DEFICIENCY) (H): ICD-10-CM

## 2022-12-28 LAB
ALBUMIN SERPL-MCNC: 4 G/DL (ref 3.4–5)
ALP SERPL-CCNC: 120 U/L (ref 40–150)
ALT SERPL W P-5'-P-CCNC: 21 U/L (ref 0–70)
ANION GAP SERPL CALCULATED.3IONS-SCNC: 6 MMOL/L (ref 3–14)
AST SERPL W P-5'-P-CCNC: 4 U/L (ref 0–45)
BASOPHILS # BLD AUTO: 0.1 10E3/UL (ref 0–0.2)
BASOPHILS NFR BLD AUTO: 1 %
BILIRUB SERPL-MCNC: 0.3 MG/DL (ref 0.2–1.3)
BUN SERPL-MCNC: 9 MG/DL (ref 7–30)
CALCIUM SERPL-MCNC: 9.2 MG/DL (ref 8.5–10.1)
CHLORIDE BLD-SCNC: 106 MMOL/L (ref 94–109)
CO2 SERPL-SCNC: 28 MMOL/L (ref 20–32)
CREAT SERPL-MCNC: 0.84 MG/DL (ref 0.66–1.25)
DEPRECATED CALCIDIOL+CALCIFEROL SERPL-MC: 31 UG/L (ref 20–75)
EOSINOPHIL # BLD AUTO: 0.5 10E3/UL (ref 0–0.7)
EOSINOPHIL NFR BLD AUTO: 5 %
ERYTHROCYTE [DISTWIDTH] IN BLOOD BY AUTOMATED COUNT: 13.4 % (ref 10–15)
GFR SERPL CREATININE-BSD FRML MDRD: >90 ML/MIN/1.73M2
GLUCOSE BLD-MCNC: 88 MG/DL (ref 70–99)
HCT VFR BLD AUTO: 41 % (ref 40–53)
HGB BLD-MCNC: 13.1 G/DL (ref 13.3–17.7)
IMM GRANULOCYTES # BLD: 0 10E3/UL
IMM GRANULOCYTES NFR BLD: 0 %
LYMPHOCYTES # BLD AUTO: 3.2 10E3/UL (ref 0.8–5.3)
LYMPHOCYTES NFR BLD AUTO: 30 %
MCH RBC QN AUTO: 27.8 PG (ref 26.5–33)
MCHC RBC AUTO-ENTMCNC: 32 G/DL (ref 31.5–36.5)
MCV RBC AUTO: 87 FL (ref 78–100)
MONOCYTES # BLD AUTO: 0.6 10E3/UL (ref 0–1.3)
MONOCYTES NFR BLD AUTO: 6 %
NEUTROPHILS # BLD AUTO: 6 10E3/UL (ref 1.6–8.3)
NEUTROPHILS NFR BLD AUTO: 58 %
NRBC # BLD AUTO: 0 10E3/UL
NRBC BLD AUTO-RTO: 0 /100
PLATELET # BLD AUTO: 462 10E3/UL (ref 150–450)
POTASSIUM BLD-SCNC: 4.1 MMOL/L (ref 3.4–5.3)
PREALB SERPL IA-MCNC: 37 MG/DL (ref 15–45)
PROT SERPL-MCNC: 8.1 G/DL (ref 6.8–8.8)
RBC # BLD AUTO: 4.72 10E6/UL (ref 4.4–5.9)
SODIUM SERPL-SCNC: 140 MMOL/L (ref 133–144)
TRANSFERRIN SERPL-MCNC: 290 MG/DL (ref 200–360)
WBC # BLD AUTO: 10.4 10E3/UL (ref 4–11)

## 2022-12-28 PROCEDURE — 84466 ASSAY OF TRANSFERRIN: CPT | Performed by: MEDICAL GENETICS

## 2022-12-28 PROCEDURE — 80053 COMPREHEN METABOLIC PANEL: CPT | Performed by: MEDICAL GENETICS

## 2022-12-28 PROCEDURE — 82139 AMINO ACIDS QUAN 6 OR MORE: CPT | Performed by: MEDICAL GENETICS

## 2022-12-28 PROCEDURE — 36415 COLL VENOUS BLD VENIPUNCTURE: CPT | Performed by: MEDICAL GENETICS

## 2022-12-28 PROCEDURE — 97803 MED NUTRITION INDIV SUBSEQ: CPT | Performed by: DIETITIAN, REGISTERED

## 2022-12-28 PROCEDURE — 85025 COMPLETE CBC W/AUTO DIFF WBC: CPT | Performed by: MEDICAL GENETICS

## 2022-12-28 PROCEDURE — G0463 HOSPITAL OUTPT CLINIC VISIT: HCPCS | Performed by: MEDICAL GENETICS

## 2022-12-28 PROCEDURE — 99215 OFFICE O/P EST HI 40 MIN: CPT | Performed by: MEDICAL GENETICS

## 2022-12-28 PROCEDURE — 82306 VITAMIN D 25 HYDROXY: CPT | Performed by: MEDICAL GENETICS

## 2022-12-28 PROCEDURE — G0463 HOSPITAL OUTPT CLINIC VISIT: HCPCS

## 2022-12-28 PROCEDURE — 84134 ASSAY OF PREALBUMIN: CPT | Performed by: MEDICAL GENETICS

## 2022-12-28 RX ORDER — SERTRALINE HYDROCHLORIDE 100 MG/1
1 TABLET, FILM COATED ORAL DAILY
COMMUNITY
Start: 2021-01-20

## 2022-12-28 NOTE — LETTER
2022    RE: Tory Stone  [address] [facility] [address]     Dear Colleague,    Thank you for the opportunity to participate in the care of your patient, Tory Stone, at the [facility] at [facility]. Please see a copy of my visit note below.    Adult Metabolism Clinic Return Visit  Name:  Tory Stone  :   1985  MRN:   [id number]  Date of Visit: Dec 28, 2022  PCP: Bony Malcolm    Managing Metabolic Center(s):  [facility].    Chief Complaint:  Mr. Tory Stone is a 37 year old male whom I saw in follow up in Metabolism Clinic for OTC (ornithine transcarbamylase deficiency) (H).  Mr. Stone was accompanied to this visit by his  fiancee. He also saw our dietitian and nurse coordinator at this visit.     Assessment:     Mr. Stone has not had any episodes that suggest decompensation of his OTC deficiency. Based on his dietary recall, his protein intake is likely marginal, a common finding in individuals with this condition where aversive response to protein may to some degree limit protein intake. It is important that he have an adequate protein intake on a regular basis. His lab testing shows that essentially normal plasma protein profile with most amino acids in normal range. Mild elevation of alanine is noted, possibly suggesting occasional occurrence of variation in his overall ammonia levels. Glutamine is in the normal range suggesting against chronic hyperammonemia. Citrulline is low normal; supplementation is not immediately necessary.     Plan:    1. Testing ordered at this visit:   Orders Placed This Encounter   Procedures     DIET CONSULT COMPREHENSIVE     Amino acids plasma quantitative     Prealbumin     Transferrin     Vitamin D Deficiency     Carnitine free and total     Comprehensive metabolic panel      CBC with platelets and differential     CBC with Platelets & Differential   .    Results are as noted, below.  2. Medications: No medications directly related to his OTC deficiency are currently prescribed  3. Metabolic dietician follow up with Jennifer Oneil RD, LD at this visit to review special dietary concerns. Metabolic diet should be continued as prescribed.  See nutrition history, below. They discussed:  ---  Nutrition Education:   Provided education on continuing low/moderate protein diet.     Reviewed intake, weight changes, and most recent labs.  -Reviewed goal of diet for UCD's of adequate/consistent protein intake without being excessive intake.  For patient, reasonable goal would be 50-60 gm/day (0.6-0.8 g/kg).  We discussed this is best utilized by the body by being spread throughout the day with 3 meals and including moderate portions of high biological value protein (dairy/meats) in each meal.    -Briefly discussed protein content of high protein foods and how likely needs to aim for 4 servings/day (1 at breakfast, 1 at lunch, and 2 at dinnertime (I.e. 2 oz meat)  total to achieve at least 1/2 daily protein intake from high value.  Will send handouts to patient for written reference.    -Encouraged 3 meals/day and using [place] Instant Breakfast to replace a meal if he does not feel like eating (10 gm pro each).  Okay to use daily for breakfast if needed (patient does not often want/feel like eating in the morning).    Goals  1. Weight maintenance  2. Meet >85% estimated nutrition needs through low/moderate protein diet  3. Protein status labs WNL: albumin, prealbumin, plasma amino acids, ammonia WNL  ---  4.   Genetic counseling: declined review .  5. Continue to observe emergency precautions as previously discussed.  Our on-call metabolic service is available 24 hours/day by calling the page  ([phone]) and asking for the Genetics and Metabolism doctor on call.  6. Return to the  Adult Metabolism Clinic in 12 months for follow-up.    ----------  History of Present Illness:  Visit Diagnosis:  OTC (ornithine transcarbamylase deficiency) (H)    Patient Active Problem List   Diagnosis     OTC (ornithine transcarbamylase deficiency) -     Need for immunization against influenza     Discussed at this visit:   Mr. Stone overall filled that he was doing generally okay. He hasn't had any incidents that he thinks are related to his OTC deficiency in the interval since last being seen. He pays close attention to his body sensations and doesn't specifically count protein, depending mostly on eating what he feels like overall. He is aware of the need to have an adequate protein intake, however.      He has not had a known COVID19 infection. He has received vaccination.    When he was last seen, he was dealing with depression and anxiety. Medication with sertraline has been helpful in this matter.    Interval History:  Tory was last seen in our clinic on  9/24/2020.  Since the last clinic visit Tory was not seen for any urgent clinic visits.  He was not seen in the emergency department.  He currently knows how to contact the 24 hour metabolic specialist on call and has a written emergency letter for this condition; This will be updated with a new date. No hospitalizations occurred.  He had no general anesthesia and no surgical procedures.      Other health services currently received are primary care   Current research study participation:    consented to participation in the Longitudinal Study of Urea Cycle Disorders            Past Medical History:  Past Medical History:   Diagnosis Date     OTC (ornithine transcarbamylase deficiency) (H) 5/14/2014     Renal stones 11/24/2014     Personal History:  Family History Update:   There was no new family history information elicited at this visit.    Tim continues to deal on a regular basis with his chronic fatigue. He was able to  "receive sliding scale insulin support. He has a new puppy that has been an enjoyable part of their daily life.  Current insurance status state/federal program (Medicaid/Medicare).     I have reviewed Tory s past medical history, family history, social history, medications and allergies as documented in the patient's electronic medical record.  There were no additional findings except as noted.     Review of Systems:   Constitional: chronic fatigue  Eyes: negative -  Wears glasses as he is nearsighted   Ears/Nose/Throat: negative - normal hearing  Respiratory:  Tires easily with exertion and gets \"winded\"  Cardiovascular: negative  Gastrointestinal:  Has some problems with reflux treated with famotidine. Does not have constipation  Genitourinary:  Has had kidney stones that required lithotripsy; Has not had any recent incidents and pays close attention to good water intake  Hematologic/Lymphatic: negative  Allergy/Immunologic: negative - no drug allergies  Musculoskeletal: negative  Endocrine: negative  Integument: negative  Neurologic:  Has recurrent migraines; also notes some hypersensitivity to touch at certain times. He treats his headacheswith over-the-counter NSAIDs. These occur a few times a month.  Psychiatric:  Anxiety and depression, medication has been helpful    Nutrition History:  Patient is on a low protein diet.     Usual intake/recall (trouble remembering, so yesterday's intake):     Earlier in day: [place] Instant Breakfast (10 gm)  Dinner: Double cheese burger (25 gm) with fries (4 gm) from [employer]     Total for day - 39 gm (0.5 g/kg)     He states his usual routine as not being big on breakfast, but once a week may have fried eggs and potatoes.  He snacks on bags of chips and fruit, and at the end of the day makes dinner with girlfriend which is typical dinners, such as tacos, chicken krys, pizza, etc.  Dinners include a meat always which he will eat a small portion of. Sometimes he " "feels he has a low appetite and does not feel like eating so uses a [place] Instant Breakfast in these instances (estimates has 1 every other day).     Food frequency  Milk - only in cereal which he eats ~1x a week  Yogurt - never, but occasionally a go-gurt  Cheese - snacks on cheese with crackers ~1x a week  Ice cream - 1-2x a month  Meat - daily (~3 oz portion), meyer 1-2x month  Eggs - 1x a week  Peanut butter/nuts - only on sandwiches 1x a week     Estimate/likely intake most days 40-50 gm/day (0.5-0.6 g/kg), less than DRI/age.    Medications:  Current Outpatient Medications   Medication Sig Dispense Refill     famotidine (PEPCID) 40 MG tablet Take 40 mg by mouth 2 times daily       Multiple Vitamins-Minerals (MULTIVITAMIN PO) Take 1 tablet by mouth daily       Allergies:  Allergies   Allergen Reactions     Sulfa Drugs      Physical Examination:  Blood pressure 130/87, pulse 92, height 5' 10\" (177.8 cm), weight 172 lb 2.9 oz (78.1 kg), head circumference 57.3 cm (22.56\").  Body surface area is 1.96 meters squared.  BMI: Body mass index is 24.71 kg/m .  General: This was a well-developed, well-nourished male who responded appropriately to all requests during the examination.    Head and Neck:  He had hair of normal texture and distribution and his head was proportionate in appearance.The neck was supple without lymphadenopathy.    Eyes:  The pupils were equal, round, and reacted to light.   The conjunctivae were clear.   Ears:  His ears were normal in architecture and placement.   Nose: The nose was clear.    Mouth and Throat: his dentition was normal.  The throat was without erythema.    Respiratory: The chest was clear to auscultation and had a symmetric appearance.    CV:  On examination of the heart, the rhythm was regular and there was no murmur.  The peripheral pulses were normal.    GI: The abdomen was soft and had normal bowel sounds.  There was no hepatosplenomegaly.    : I deferred a  " examination.   Musculoskeletal: There was a full range of motion on the extremity exam, and normal muscular volume and bulk.   Neurologic: The neurologic exam was normal, with normal cranial nerves, normal deep tendon reflexes, normal sensation, and a normal gait. He had normal tone.   Integument: The skin was normal with no rashes or unusual pigmentation.    Results of laboratory studies collected at this visit and available when this note was completed:   Results for orders placed or performed in visit on 12/28/22   Amino acids plasma quantitative     Status: Abnormal   Result Value Ref Range    A-Aminoadipic 0 0 - 6 umol/dL    Alanine 75 (H) 22 - 62 umol/dL    Anserine 0 umol/dL    Arginine 6 2 - 18 umol/dL    Asparagine 5 1 - 5 umol/dL    Aspartic Acid 1 0 - 4 umol/dL    B-Alanine Plasma 0 umol/dL    B-Aminoisobutyric 0 umol/dL    Carnosine 0 umol/dL    Citrulline 1.5 1.3 - 6.0 umol/dL    Cystathionine 0 umol/dL    Cystine 8 3 - 15 umol/dL    Glutamic Acid 6 0 - 16 umol/dL    Glutamine 61 41 - 86 umol/dL    Glycine 33 13 - 50 umol/dL    Histidine 7 3 - 15 umol/dL    1-Methylhistidine 0 0 - 2 umol/dL    3-Methylhistidine 1 0 - 1 umol/dL    Homocysteine umol/dL 0 umol/dL    Hydroxylysine 0 umol/dL    Hydroxyproline 2 0 - 3 umol/dL    Isoleucine 7 4 - 11 umol/dL    Leucine 10 8 - 21 umol/dL    Lysine 14 6 - 26 umol/dL    Methionine 2 1 - 6 umol/dL    Ornithine 5 2 - 16 umol/dL    Phenylalanine umol/dL 4.5 3.0 - 10.0 umol/dL    Proline 35 0 - 48 umol/dL    Sarcosine Plasma 0 umol/dL    Serine 8 4 - 18 umol/dL    Taurine 7 7 - 32 umol/dL    Threonine 16 5 - 25 umol/dL    Tyrosine umol/dL 5.6 4.0 - 13.0 umol/dL    Valine 20 8 - 46 umol/dL    Amino Acid Plasma Interpretation       Prealbumin     Status: Normal   Result Value Ref Range    Prealbumin 37 15 - 45 mg/dL   Transferrin     Status: Normal   Result Value Ref Range    Transferrin 290.0 200.0 - 360.0 mg/dL   Vitamin D Deficiency     Status: Normal   Result Value  Ref Range    Vitamin D, Total (25-Hydroxy) 31 20 - 75 ug/L    Narrative    Season, race, dietary intake, and treatment affect the concentration of 25-hydroxy-Vitamin D. Values may decrease during winter months and increase during summer months. Values 20-29 ug/L may indicate Vitamin D insufficiency and values <20 ug/L may indicate Vitamin D deficiency.    Vitamin D determination is routinely performed by an immunoassay specific for 25 hydroxyvitamin D3.  If an individual is on vitamin D2(ergocalciferol) supplementation, please specify 25 OH vitamin D2 and D3 level determination by LCMSMS test VITD23.     Carnitine free and total     Status: None   Result Value Ref Range    Carnitine Free 39 25 - 60 umol/L    Carnitine Total 45 34 - 86 umol/L    Carnitine Esterified 6 5 - 29 umol/L    Carnitine Esterified/Free Ratio 0.2 0.1 - 1.0   Comprehensive metabolic panel     Status: Normal   Result Value Ref Range    Sodium 140 133 - 144 mmol/L    Potassium 4.1 3.4 - 5.3 mmol/L    Chloride 106 94 - 109 mmol/L    Carbon Dioxide (CO2) 28 20 - 32 mmol/L    Anion Gap 6 3 - 14 mmol/L    Urea Nitrogen 9 7 - 30 mg/dL    Creatinine 0.84 0.66 - 1.25 mg/dL    Calcium 9.2 8.5 - 10.1 mg/dL    Glucose 88 70 - 99 mg/dL    Alkaline Phosphatase 120 40 - 150 U/L    AST 4 0 - 45 U/L    ALT 21 0 - 70 U/L    Protein Total 8.1 6.8 - 8.8 g/dL    Albumin 4.0 3.4 - 5.0 g/dL    Bilirubin Total 0.3 0.2 - 1.3 mg/dL    GFR Estimate >90 >60 mL/min/1.73m2   CBC with platelets and differential     Status: Abnormal   Result Value Ref Range    WBC Count 10.4 4.0 - 11.0 10e3/uL    RBC Count 4.72 4.40 - 5.90 10e6/uL    Hemoglobin 13.1 (L) 13.3 - 17.7 g/dL    Hematocrit 41.0 40.0 - 53.0 %    MCV 87 78 - 100 fL    MCH 27.8 26.5 - 33.0 pg    MCHC 32.0 31.5 - 36.5 g/dL    RDW 13.4 10.0 - 15.0 %    Platelet Count 462 (H) 150 - 450 10e3/uL    % Neutrophils 58 %    % Lymphocytes 30 %    % Monocytes 6 %    % Eosinophils 5 %    % Basophils 1 %    % Immature Granulocytes  0 %    NRBCs per 100 WBC 0 <1 /100    Absolute Neutrophils 6.0 1.6 - 8.3 10e3/uL    Absolute Lymphocytes 3.2 0.8 - 5.3 10e3/uL    Absolute Monocytes 0.6 0.0 - 1.3 10e3/uL    Absolute Eosinophils 0.5 0.0 - 0.7 10e3/uL    Absolute Basophils 0.1 0.0 - 0.2 10e3/uL    Absolute Immature Granulocytes 0.0 <=0.4 10e3/uL    Absolute NRBCs 0.0 10e3/uL   CBC with Platelets & Differential     Status: Abnormal    Narrative    The following orders were created for panel order CBC with Platelets & Differential.  Procedure                               Abnormality         Status                     ---------                               -----------         ------                     CBC with platelets and d...[[id number]]  Abnormal            Final result                 Please view results for these tests on the individual orders.     DIANE HERNANDEZ M.D., FAAP, FAC  Professor  Division of Genetics and Metabolism  Department of Pediatrics  [facility]    Routed to patient in Comm Mgt  Also to Bony Malcolm    45 minutes spent on the date of the encounter doing chart review, history and exam, documentation and further activities per the note    Please do not hesitate to contact me if you have any questions/concerns.     Sincerely,       Diane Hernandez MD

## 2022-12-28 NOTE — TELEPHONE ENCOUNTER
12/28/2022    Tory was offered genetic counseling review at his annual in person appointment with Dr. Benton this morning for his OTC deficiency. He declined at this time. We will continue to check in at future appointments and are, of course, available should any questions arise in the interim.    Clemencia Marcum MS, [facility]  Genetic Counseling  [facility]  Pager: [phone]  Phone: [phone]  Fax: [phone]

## 2022-12-28 NOTE — NURSING NOTE
"Chief Complaint   Patient presents with     RECHECK     Follow up        Vitals:    12/28/22 1025   BP: 130/87   BP Location: Right arm   Patient Position: Sitting   Cuff Size: Adult Regular   Pulse: 92   Weight: 172 lb 2.9 oz (78.1 kg)   Height: 5' 10\" (177.8 cm)   HC: 57.3 cm (22.56\")       Joanie Yañez, EMT   December 28, 2022  "

## 2022-12-28 NOTE — PATIENT INSTRUCTIONS
Adult Metabolism Clinic  [facility]     If questions/concerns, feel free to reach our nurse coordinator at the below number or send a Cert[employer] message for any non-urgent questions/concerns.    If any immediate needs because of illness or symptoms, contact the Pediatric Genetic/Metabolic physician on-call via [phone].    Team contact numbers:   RN Care Coordinator: [phone]   Jennifer Oneil RD, LD (dietitian): [phone] or [email or web address]  Genetic/Metabolic Physician On-call: [phone]     Scheduling numbers:  General scheduling (Adult Call Center): [phone]               Services: [phone]

## 2022-12-28 NOTE — PROGRESS NOTES
Adult Metabolism Clinic Return Visit  Name:  Tory Stone  :   1985  MRN:   [id number]  Date of Visit: Dec 28, 2022  PCP: Bony Malcolm    Managing Metabolic Center(s):  [facility].    Chief Complaint:  Mr. Tory Stone is a 37 year old male whom I saw in follow up in Metabolism Clinic for OTC (ornithine transcarbamylase deficiency) (H).  Mr. Stone was accompanied to this visit by his  fiancee. He also saw our dietitian and nurse coordinator at this visit.     Assessment:     Mr. Stone has not had any episodes that suggest decompensation of his OTC deficiency. Based on his dietary recall, his protein intake is likely marginal, a common finding in individuals with this condition where aversive response to protein may to some degree limit protein intake. It is important that he have an adequate protein intake on a regular basis. His lab testing shows that essentially normal plasma protein profile with most amino acids in normal range. Mild elevation of alanine is noted, possibly suggesting occasional occurrence of variation in his overall ammonia levels. Glutamine is in the normal range suggesting against chronic hyperammonemia. Citrulline is low normal; supplementation is not immediately necessary.     Plan:    1. Testing ordered at this visit:   Orders Placed This Encounter   Procedures     DIET CONSULT COMPREHENSIVE     Amino acids plasma quantitative     Prealbumin     Transferrin     Vitamin D Deficiency     Carnitine free and total     Comprehensive metabolic panel     CBC with platelets and differential     CBC with Platelets & Differential   .    Results are as noted, below.  2. Medications: No medications directly related to his OTC deficiency are currently prescribed  3. Metabolic dietician follow up with Jennifer Oneil RD, LD at this visit to review special dietary concerns. Metabolic diet should be continued as  prescribed.  See nutrition history, below. They discussed:  ---  Nutrition Education:   Provided education on continuing low/moderate protein diet.     Reviewed intake, weight changes, and most recent labs.  -Reviewed goal of diet for UCD's of adequate/consistent protein intake without being excessive intake.  For patient, reasonable goal would be 50-60 gm/day (0.6-0.8 g/kg).  We discussed this is best utilized by the body by being spread throughout the day with 3 meals and including moderate portions of high biological value protein (dairy/meats) in each meal.    -Briefly discussed protein content of high protein foods and how likely needs to aim for 4 servings/day (1 at breakfast, 1 at lunch, and 2 at dinnertime (I.e. 2 oz meat)  total to achieve at least 1/2 daily protein intake from high value.  Will send handouts to patient for written reference.    -Encouraged 3 meals/day and using [place] Instant Breakfast to replace a meal if he does not feel like eating (10 gm pro each).  Okay to use daily for breakfast if needed (patient does not often want/feel like eating in the morning).    Goals  1. Weight maintenance  2. Meet >85% estimated nutrition needs through low/moderate protein diet  3. Protein status labs WNL: albumin, prealbumin, plasma amino acids, ammonia WNL  ---  4.   Genetic counseling: declined review .  5. Continue to observe emergency precautions as previously discussed.  Our on-call metabolic service is available 24 hours/day by calling the page  ([phone]) and asking for the Genetics and Metabolism doctor on call.  6. Return to the Adult Metabolism Clinic in 12 months for follow-up.    ----------  History of Present Illness:  Visit Diagnosis:  OTC (ornithine transcarbamylase deficiency) (H)    Patient Active Problem List   Diagnosis     OTC (ornithine transcarbamylase deficiency) -     Need for immunization against influenza     Discussed at this visit:   Mr. Stone overall filled  that he was doing generally okay. He hasn't had any incidents that he thinks are related to his OTC deficiency in the interval since last being seen. He pays close attention to his body sensations and doesn't specifically count protein, depending mostly on eating what he feels like overall. He is aware of the need to have an adequate protein intake, however.      He has not had a known COVID19 infection. He has received vaccination.    When he was last seen, he was dealing with depression and anxiety. Medication with sertraline has been helpful in this matter.    Interval History:  Tory was last seen in our clinic on  9/24/2020.  Since the last clinic visit Tory was not seen for any urgent clinic visits.  He was not seen in the emergency department.  He currently knows how to contact the 24 hour metabolic specialist on call and has a written emergency letter for this condition; This will be updated with a new date. No hospitalizations occurred.  He had no general anesthesia and no surgical procedures.      Other health services currently received are primary care   Current research study participation:    consented to participation in the Longitudinal Study of Urea Cycle Disorders            Past Medical History:  Past Medical History:   Diagnosis Date     OTC (ornithine transcarbamylase deficiency) (H) 5/14/2014     Renal stones 11/24/2014     Personal History:  Family History Update:   There was no new family history information elicited at this visit.    Tim continues to deal on a regular basis with his chronic fatigue. He was able to receive sliding scale insulin support. He has a new puppy that has been an enjoyable part of their daily life.  Current insurance status state/federal program (Medicaid/Medicare).     I have reviewed Tory s past medical history, family history, social history, medications and allergies as documented in the patient's electronic medical record.  There were no  "additional findings except as noted.     Review of Systems:   Constitional: chronic fatigue  Eyes: negative -  Wears glasses as he is nearsighted   Ears/Nose/Throat: negative - normal hearing  Respiratory:  Tires easily with exertion and gets \"winded\"  Cardiovascular: negative  Gastrointestinal:  Has some problems with reflux treated with famotidine. Does not have constipation  Genitourinary:  Has had kidney stones that required lithotripsy; Has not had any recent incidents and pays close attention to good water intake  Hematologic/Lymphatic: negative  Allergy/Immunologic: negative - no drug allergies  Musculoskeletal: negative  Endocrine: negative  Integument: negative  Neurologic:  Has recurrent migraines; also notes some hypersensitivity to touch at certain times. He treats his headacheswith over-the-counter NSAIDs. These occur a few times a month.  Psychiatric:  Anxiety and depression, medication has been helpful    Nutrition History:  Patient is on a low protein diet.     Usual intake/recall (trouble remembering, so yesterday's intake):     Earlier in day: [place] Instant Breakfast (10 gm)  Dinner: Double cheese burger (25 gm) with fries (4 gm) from [employer]     Total for day - 39 gm (0.5 g/kg)     He states his usual routine as not being big on breakfast, but once a week may have fried eggs and potatoes.  He snacks on bags of chips and fruit, and at the end of the day makes dinner with girlfriend which is typical dinners, such as tacos, chicken krys, pizza, etc.  Dinners include a meat always which he will eat a small portion of. Sometimes he feels he has a low appetite and does not feel like eating so uses a [place] Instant Breakfast in these instances (estimates has 1 every other day).     Food frequency  Milk - only in cereal which he eats ~1x a week  Yogurt - never, but occasionally a go-gurt  Cheese - snacks on cheese with crackers ~1x a week  Ice cream - 1-2x a month  Meat - daily (~3 oz " "portion), meyer 1-2x month  Eggs - 1x a week  Peanut butter/nuts - only on sandwiches 1x a week     Estimate/likely intake most days 40-50 gm/day (0.5-0.6 g/kg), less than DRI/age.    Medications:  Current Outpatient Medications   Medication Sig Dispense Refill     famotidine (PEPCID) 40 MG tablet Take 40 mg by mouth 2 times daily       Multiple Vitamins-Minerals (MULTIVITAMIN PO) Take 1 tablet by mouth daily       Allergies:  Allergies   Allergen Reactions     Sulfa Drugs      Physical Examination:  Blood pressure 130/87, pulse 92, height 5' 10\" (177.8 cm), weight 172 lb 2.9 oz (78.1 kg), head circumference 57.3 cm (22.56\").  Body surface area is 1.96 meters squared.  BMI: Body mass index is 24.71 kg/m .  General: This was a well-developed, well-nourished male who responded appropriately to all requests during the examination.    Head and Neck:  He had hair of normal texture and distribution and his head was proportionate in appearance.The neck was supple without lymphadenopathy.    Eyes:  The pupils were equal, round, and reacted to light.   The conjunctivae were clear.   Ears:  His ears were normal in architecture and placement.   Nose: The nose was clear.    Mouth and Throat: his dentition was normal.  The throat was without erythema.    Respiratory: The chest was clear to auscultation and had a symmetric appearance.    CV:  On examination of the heart, the rhythm was regular and there was no murmur.  The peripheral pulses were normal.    GI: The abdomen was soft and had normal bowel sounds.  There was no hepatosplenomegaly.    : I deferred a  examination.   Musculoskeletal: There was a full range of motion on the extremity exam, and normal muscular volume and bulk.   Neurologic: The neurologic exam was normal, with normal cranial nerves, normal deep tendon reflexes, normal sensation, and a normal gait. He had normal tone.   Integument: The skin was normal with no rashes or unusual pigmentation.    Results " of laboratory studies collected at this visit and available when this note was completed:   Results for orders placed or performed in visit on 12/28/22   Amino acids plasma quantitative     Status: Abnormal   Result Value Ref Range    A-Aminoadipic 0 0 - 6 umol/dL    Alanine 75 (H) 22 - 62 umol/dL    Anserine 0 umol/dL    Arginine 6 2 - 18 umol/dL    Asparagine 5 1 - 5 umol/dL    Aspartic Acid 1 0 - 4 umol/dL    B-Alanine Plasma 0 umol/dL    B-Aminoisobutyric 0 umol/dL    Carnosine 0 umol/dL    Citrulline 1.5 1.3 - 6.0 umol/dL    Cystathionine 0 umol/dL    Cystine 8 3 - 15 umol/dL    Glutamic Acid 6 0 - 16 umol/dL    Glutamine 61 41 - 86 umol/dL    Glycine 33 13 - 50 umol/dL    Histidine 7 3 - 15 umol/dL    1-Methylhistidine 0 0 - 2 umol/dL    3-Methylhistidine 1 0 - 1 umol/dL    Homocysteine umol/dL 0 umol/dL    Hydroxylysine 0 umol/dL    Hydroxyproline 2 0 - 3 umol/dL    Isoleucine 7 4 - 11 umol/dL    Leucine 10 8 - 21 umol/dL    Lysine 14 6 - 26 umol/dL    Methionine 2 1 - 6 umol/dL    Ornithine 5 2 - 16 umol/dL    Phenylalanine umol/dL 4.5 3.0 - 10.0 umol/dL    Proline 35 0 - 48 umol/dL    Sarcosine Plasma 0 umol/dL    Serine 8 4 - 18 umol/dL    Taurine 7 7 - 32 umol/dL    Threonine 16 5 - 25 umol/dL    Tyrosine umol/dL 5.6 4.0 - 13.0 umol/dL    Valine 20 8 - 46 umol/dL    Amino Acid Plasma Interpretation       Prealbumin     Status: Normal   Result Value Ref Range    Prealbumin 37 15 - 45 mg/dL   Transferrin     Status: Normal   Result Value Ref Range    Transferrin 290.0 200.0 - 360.0 mg/dL   Vitamin D Deficiency     Status: Normal   Result Value Ref Range    Vitamin D, Total (25-Hydroxy) 31 20 - 75 ug/L    Narrative    Season, race, dietary intake, and treatment affect the concentration of 25-hydroxy-Vitamin D. Values may decrease during winter months and increase during summer months. Values 20-29 ug/L may indicate Vitamin D insufficiency and values <20 ug/L may indicate Vitamin D deficiency.    Vitamin D  determination is routinely performed by an immunoassay specific for 25 hydroxyvitamin D3.  If an individual is on vitamin D2(ergocalciferol) supplementation, please specify 25 OH vitamin D2 and D3 level determination by LCMSMS test VITD23.     Carnitine free and total     Status: None   Result Value Ref Range    Carnitine Free 39 25 - 60 umol/L    Carnitine Total 45 34 - 86 umol/L    Carnitine Esterified 6 5 - 29 umol/L    Carnitine Esterified/Free Ratio 0.2 0.1 - 1.0   Comprehensive metabolic panel     Status: Normal   Result Value Ref Range    Sodium 140 133 - 144 mmol/L    Potassium 4.1 3.4 - 5.3 mmol/L    Chloride 106 94 - 109 mmol/L    Carbon Dioxide (CO2) 28 20 - 32 mmol/L    Anion Gap 6 3 - 14 mmol/L    Urea Nitrogen 9 7 - 30 mg/dL    Creatinine 0.84 0.66 - 1.25 mg/dL    Calcium 9.2 8.5 - 10.1 mg/dL    Glucose 88 70 - 99 mg/dL    Alkaline Phosphatase 120 40 - 150 U/L    AST 4 0 - 45 U/L    ALT 21 0 - 70 U/L    Protein Total 8.1 6.8 - 8.8 g/dL    Albumin 4.0 3.4 - 5.0 g/dL    Bilirubin Total 0.3 0.2 - 1.3 mg/dL    GFR Estimate >90 >60 mL/min/1.73m2   CBC with platelets and differential     Status: Abnormal   Result Value Ref Range    WBC Count 10.4 4.0 - 11.0 10e3/uL    RBC Count 4.72 4.40 - 5.90 10e6/uL    Hemoglobin 13.1 (L) 13.3 - 17.7 g/dL    Hematocrit 41.0 40.0 - 53.0 %    MCV 87 78 - 100 fL    MCH 27.8 26.5 - 33.0 pg    MCHC 32.0 31.5 - 36.5 g/dL    RDW 13.4 10.0 - 15.0 %    Platelet Count 462 (H) 150 - 450 10e3/uL    % Neutrophils 58 %    % Lymphocytes 30 %    % Monocytes 6 %    % Eosinophils 5 %    % Basophils 1 %    % Immature Granulocytes 0 %    NRBCs per 100 WBC 0 <1 /100    Absolute Neutrophils 6.0 1.6 - 8.3 10e3/uL    Absolute Lymphocytes 3.2 0.8 - 5.3 10e3/uL    Absolute Monocytes 0.6 0.0 - 1.3 10e3/uL    Absolute Eosinophils 0.5 0.0 - 0.7 10e3/uL    Absolute Basophils 0.1 0.0 - 0.2 10e3/uL    Absolute Immature Granulocytes 0.0 <=0.4 10e3/uL    Absolute NRBCs 0.0 10e3/uL   CBC with Platelets &  Differential     Status: Abnormal    Narrative    The following orders were created for panel order CBC with Platelets & Differential.  Procedure                               Abnormality         Status                     ---------                               -----------         ------                     CBC with platelets and d...[[id number]]  Abnormal            Final result                 Please view results for these tests on the individual orders.     CHAN HERNANDEZ M.D., FAAP, [facility]  Professor  Division of Genetics and Metabolism  Department of Pediatrics  [facility]    Routed to patient in Comm Mgt  Also to Bony Malcolm    45 minutes spent on the date of the encounter doing chart review, history and exam, documentation and further activities per the note

## 2022-12-28 NOTE — LETTER
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 Due to the metabolic defect, he has a defective ability to produce urea from ammonia. Therefore, he is susceptible to hyperammonemia. Because of the metabolic block, vomiting, lethargy, coma and possibly death could occur. Signs and symptoms of hyperammonemia could include, but are not limited to, severe headache, abdominal pain, vomiting, diarrhea, extreme sleepiness or lack of energy, slurred speech, poor coordination or balance problems, behavior or personality changes and confusion.      Tory is at great risk of medical emergency under the following circumstances. Tory is especially vulnerable to acute exacerbations with poor oral intake, prolonged fasting, and severe body stresses such as dehydration, fever, viral or bacterial illnesses, surgery, or a severe catabolic state or high protein intake.      This letter is not exhaustive and is not a substitute for contact with the Genetics and Metabolism physician on call available 24 hours/day via the page  ([phone]). Please initiate the protocol below and contact us immediately.       Acute Treatment:    Continue medications as prescribed.    During any acute illness, protein intake should be reduced to a minimum or eliminated for 24 hours and sugar-containing liquids in increased amounts should be administered.    Room [place] immediately and start an IV.    D10 1/2NS at 1 1/2 times maintenance with appropriate electrolytes. If dehydrated do not wait to complete a bolus to start D10; add saline bolus parallel to D10 infusion.    If no enteral intake estimated > 12 hours or if this is anticipated to occur, start IV lipids @ 2 grams/kg/day (peripheral line  adequate).    Ammonia levels should be monitored closely.  Specific ammonia detoxifying medications (sodium benzoate and sodium phenylacetate) may be required for treatment of a severe episode.  These medications are not generally available, except at a tertiary care center.      Evaluate and aggressively treat precipitating event.    If Tory does not respond to above intervention, more intensive management may be required; transfer to tertiary care may be indicated.    Pre-coordination with Metabolism is needed if surgery/anesthesia is required.    The use of steroids can precipitate hyperammonemia and should be used only if the need to treat airway symptoms is urgent. Please consult Metabolism for assistance in management if steroid use is contemplated.     Immediate Laboratory Studies to Order:    Blood glucose, electrolytes, liver function tests    Ammonia     cc: TIM HARE     cc: Tory Stone  [address] [place] [id number]

## 2022-12-30 LAB
(HCYS)2 SERPL-SCNC: 0 UMOL/DL
1ME-HIST SERPL-SCNC: 0 UMOL/DL (ref 0–2)
3ME-HISTIDINE SERPL-SCNC: 1 UMOL/DL (ref 0–1)
AAA SERPL-SCNC: 0 UMOL/DL (ref 0–6)
ALANINE SERPL-SCNC: 0 UMOL/DL
ALANINE SFR SERPL: 75 UMOL/DL (ref 22–62)
AMINO ACID PAT SERPL-IMP: ABNORMAL
ANSERINE SERPL-SCNC: 0 UMOL/DL
ARGININE SERPL-SCNC: 6 UMOL/DL (ref 2–18)
ASPARAGINE SERPL-SCNC: 5 UMOL/DL (ref 1–5)
ASPARTATE SERPL-SCNC: 1 UMOL/DL (ref 0–4)
B-AIB SERPL-SCNC: 0 UMOL/DL
CARNOSINE SERPL-SCNC: 0 UMOL/DL
CITRULLINE SERPL-SCNC: 1.5 UMOL/DL (ref 1.3–6)
CYSTATHIONIN SERPL-SCNC: 0 UMOL/DL
CYSTINE SERPL-SCNC: 8 UMOL/DL (ref 3–15)
GLUTAMATE SERPL-SCNC: 6 UMOL/DL (ref 0–16)
GLUTAMATE SERPL-SCNC: 61 UMOL/DL (ref 41–86)
GLYCINE SERPL-SCNC: 33 UMOL/DL (ref 13–50)
HISTIDINE SERPL-SCNC: 7 UMOL/DL (ref 3–15)
ISOLEUCINE SERPL-SCNC: 7 UMOL/DL (ref 4–11)
LEUCINE SERPL-SCNC: 10 UMOL/DL (ref 8–21)
LYSINE SERPL-SCNC: 14 UMOL/DL (ref 6–26)
METHIONINE SERPL-SCNC: 2 UMOL/DL (ref 1–6)
OH-LYSINE SERPL-SCNC: 0 UMOL/DL
OH-PROLINE SERPL-SCNC: 2 UMOL/DL (ref 0–3)
ORNITHINE SERPL-SCNC: 5 UMOL/DL (ref 2–16)
PHE SERPL-SCNC: 4.5 UMOL/DL (ref 3–10)
PROLINE SERPL-SCNC: 35 UMOL/DL (ref 0–48)
SARCOSINE SERPL-SCNC: 0 UMOL/DL
SERINE SERPL-SCNC: 8 UMOL/DL (ref 4–18)
TAURINE SERPL-SCNC: 7 UMOL/DL (ref 7–32)
THREONINE SERPL-SCNC: 16 UMOL/DL (ref 5–25)
TYROSINE SERPL-SCNC: 5.6 UMOL/DL (ref 4–13)
VALINE SERPL-SCNC: 20 UMOL/DL (ref 8–46)

## 2022-12-31 LAB
ACYLCARNITINE SERPL-SCNC: 6 UMOL/L
CARN ESTERS/C0 SERPL-SRTO: 0.2 {RATIO}
CARNITINE FREE SERPL-SCNC: 39 UMOL/L
CARNITINE SERPL-SCNC: 45 UMOL/L

## 2023-01-03 NOTE — PROGRESS NOTES
..CLINICAL NUTRITION SERVICES - ASSESSMENT NOTE    REASON FOR ASSESSMENT  Tory Stone is a 37 year old male seen by the dietitian for consult regarding OTC deficiency/urea cycle disorder.    ANTHROPOMETRICS  Height: 177.8 cm or 70 in   Weight: 78 kg or 172 lbs  BMI: 24.7    Comments: per patient, weight has been stable with no significant increases or decreases in past year    NUTRITION HISTORY  Patient is on a low protein diet.    Usual intake/recall (trouble remembering, so yesterday's intake):    Earlier in day: [place] Instant Breakfast (10 gm)  Dinner: Double cheese burger (25 gm) with fries (4 gm) from [employer]    Total for day - 39 gm (0.5 g/kg)    He states his usual routine as not being big on breakfast, but once a week may have fried eggs and potatoes.  He snacks on bags of chips and fruit, and at the end of the day makes dinner with girlfriend which is typical dinners, such as tacos, chicken krys, pizza, etc.  Dinners include a meat always which he will eat a small portion of. Sometimes he feels he has a low appetite and does not feel like eating so uses a [place] Instant Breakfast in these instances (estimates has 1 every other day).    Food frequency  Milk - only in cereal which he eats ~1x a week  Yogurt - never, but occasionally a go-gurt  Cheese - snacks on cheese with crackers ~1x a week  Ice cream - 1-2x a month  Meat - daily (~3 oz portion), meyer 1-2x month  Eggs - 1x a week  Peanut butter/nuts - only on sandwiches 1x a week    Estimate/likely intake most days 40-50 gm/day (0.5-0.6 g/kg), less than DRI/age.    LABS  Labs reviewed; labs are fasting, he has not eaten yet today  Plasma amino acids  -GLUT: 61, WNL  -DARRELL: 10, WNL  -ILE: 7, WNL  -KIMI: 20, WNL  Prealbumin: 37, WNL  Transferrin: 290, WNL  Vitamin D: 31, WNL  Hemoglobin: 13.2, low (13.3-17.7)    MEDICATIONS  Medications reviewed  -MVI daily    ASSESSED NUTRITION NEEDS:  Estimated Energy Needs: [facility] (1700) x  1.2-1.3 = 26-28 kcal/kg  Estimated Protein Needs: DRI/age: 0.8 g/kg, range for age/UCD: 0.5-1 g/kg  Estimated Fluid Needs: Baseline 2660 mLs  Micronutrient Needs: DRI/age: 15 mcg Vitamin D, 1000 mg calcium, 8 mg iron daily    NUTRITION DIAGNOSIS:  Potential for impaired nutrient utilization related to diagnosis of OTC deficiency/UCD as evidenced by risk of hyperammonemia with stress/illness, catabolism, or very high protein intake.    INTERVENTIONS  Nutrition Prescription  Meet 100% estimated nutrition needs through low/moderate protein diet.    Nutrition Education:   Provided education on continuing low/moderate protein diet.    Reviewed intake, weight changes, and most recent labs.  -Reviewed goal of diet for UCD's of adequate/consistent protein intake without being excessive intake.  For patient, reasonable goal would be 50-60 gm/day (0.6-0.8 g/kg).  We discussed this is best utilized by the body by being spread throughout the day with 3 meals and including moderate portions of high biological value protein (dairy/meats) in each meal.    -Briefly discussed protein content of high protein foods and how likely needs to aim for 4 servings/day (1 at breakfast, 1 at lunch, and 2 at dinnertime (I.e. 2 oz meat)  total to achieve at least 1/2 daily protein intake from high value.  Will send handouts to patient for written reference.    -Encouraged 3 meals/day and using [place] Instant Breakfast to replace a meal if he does not feel like eating (10 gm pro each).  Okay to use daily for breakfast if needed (patient does not often want/feel like eating in the morning).    Goals  1. Weight maintenance  2. Meet >85% estimated nutrition needs through low/moderate protein diet  3. Protein status labs WNL: albumin, prealbumin, plasma amino acids, ammonia WNL    FOLLOW UP/MONITORING  Food and Beverage intake  Macronutrient intake  Anthropometric measurements    Jennifer Oneil RD, LD    Time spent with patient: 15 minutes

## 2023-04-08 ENCOUNTER — HEALTH MAINTENANCE LETTER (OUTPATIENT)
Age: 38
End: 2023-04-08

## 2024-04-10 ENCOUNTER — OFFICE VISIT (OUTPATIENT)
Dept: PEDIATRICS | Facility: CLINIC | Age: 39
End: 2024-04-10
Attending: MEDICAL GENETICS
Payer: COMMERCIAL

## 2024-04-10 ENCOUNTER — ALLIED HEALTH/NURSE VISIT (OUTPATIENT)
Dept: PEDIATRICS | Facility: CLINIC | Age: 39
End: 2024-04-10
Attending: DIETITIAN, REGISTERED
Payer: COMMERCIAL

## 2024-04-10 VITALS
WEIGHT: 164.02 LBS | BODY MASS INDEX: 23.48 KG/M2 | HEIGHT: 70 IN | DIASTOLIC BLOOD PRESSURE: 78 MMHG | SYSTOLIC BLOOD PRESSURE: 116 MMHG | HEART RATE: 90 BPM

## 2024-04-10 DIAGNOSIS — E72.4 OTC (ORNITHINE TRANSCARBAMYLASE DEFICIENCY) (H): Primary | ICD-10-CM

## 2024-04-10 LAB
ALBUMIN SERPL BCG-MCNC: 5 G/DL (ref 3.5–5.2)
ALP SERPL-CCNC: 127 U/L (ref 40–150)
ALT SERPL W P-5'-P-CCNC: 53 U/L (ref 0–70)
ANION GAP SERPL CALCULATED.3IONS-SCNC: 13 MMOL/L (ref 7–15)
AST SERPL W P-5'-P-CCNC: 24 U/L (ref 0–45)
BASOPHILS # BLD AUTO: 0.1 10E3/UL (ref 0–0.2)
BASOPHILS NFR BLD AUTO: 1 %
BILIRUB SERPL-MCNC: 0.6 MG/DL
BUN SERPL-MCNC: 11.7 MG/DL (ref 6–20)
CALCIUM SERPL-MCNC: 9.6 MG/DL (ref 8.6–10)
CHLORIDE SERPL-SCNC: 100 MMOL/L (ref 98–107)
CREAT SERPL-MCNC: 0.89 MG/DL (ref 0.67–1.17)
DEPRECATED HCO3 PLAS-SCNC: 23 MMOL/L (ref 22–29)
EGFRCR SERPLBLD CKD-EPI 2021: >90 ML/MIN/1.73M2
EOSINOPHIL # BLD AUTO: 0.5 10E3/UL (ref 0–0.7)
EOSINOPHIL NFR BLD AUTO: 5 %
ERYTHROCYTE [DISTWIDTH] IN BLOOD BY AUTOMATED COUNT: 13.5 % (ref 10–15)
GLUCOSE SERPL-MCNC: 77 MG/DL (ref 70–99)
HCT VFR BLD AUTO: 43.8 % (ref 40–53)
HGB BLD-MCNC: 14.5 G/DL (ref 13.3–17.7)
IMM GRANULOCYTES # BLD: 0 10E3/UL
IMM GRANULOCYTES NFR BLD: 0 %
LYMPHOCYTES # BLD AUTO: 3.3 10E3/UL (ref 0.8–5.3)
LYMPHOCYTES NFR BLD AUTO: 37 %
MCH RBC QN AUTO: 28 PG (ref 26.5–33)
MCHC RBC AUTO-ENTMCNC: 33.1 G/DL (ref 31.5–36.5)
MCV RBC AUTO: 85 FL (ref 78–100)
MONOCYTES # BLD AUTO: 0.6 10E3/UL (ref 0–1.3)
MONOCYTES NFR BLD AUTO: 7 %
NEUTROPHILS # BLD AUTO: 4.4 10E3/UL (ref 1.6–8.3)
NEUTROPHILS NFR BLD AUTO: 50 %
NRBC # BLD AUTO: 0 10E3/UL
NRBC BLD AUTO-RTO: 0 /100
PLATELET # BLD AUTO: 431 10E3/UL (ref 150–450)
POTASSIUM SERPL-SCNC: 4.3 MMOL/L (ref 3.4–5.3)
PREALB SERPL-MCNC: 33.2 MG/DL (ref 20–40)
PROT SERPL-MCNC: 8.2 G/DL (ref 6.4–8.3)
RBC # BLD AUTO: 5.17 10E6/UL (ref 4.4–5.9)
SODIUM SERPL-SCNC: 136 MMOL/L (ref 135–145)
TRANSFERRIN SERPL-MCNC: 331 MG/DL (ref 200–360)
VIT D+METAB SERPL-MCNC: 36 NG/ML (ref 20–50)
WBC # BLD AUTO: 8.9 10E3/UL (ref 4–11)

## 2024-04-10 PROCEDURE — 82306 VITAMIN D 25 HYDROXY: CPT | Performed by: MEDICAL GENETICS

## 2024-04-10 PROCEDURE — 97803 MED NUTRITION INDIV SUBSEQ: CPT | Performed by: DIETITIAN, REGISTERED

## 2024-04-10 PROCEDURE — 82139 AMINO ACIDS QUAN 6 OR MORE: CPT | Performed by: MEDICAL GENETICS

## 2024-04-10 PROCEDURE — 36415 COLL VENOUS BLD VENIPUNCTURE: CPT | Performed by: MEDICAL GENETICS

## 2024-04-10 PROCEDURE — 84134 ASSAY OF PREALBUMIN: CPT | Performed by: MEDICAL GENETICS

## 2024-04-10 PROCEDURE — G2211 COMPLEX E/M VISIT ADD ON: HCPCS | Performed by: MEDICAL GENETICS

## 2024-04-10 PROCEDURE — 99215 OFFICE O/P EST HI 40 MIN: CPT | Performed by: MEDICAL GENETICS

## 2024-04-10 PROCEDURE — 84450 TRANSFERASE (AST) (SGOT): CPT | Performed by: MEDICAL GENETICS

## 2024-04-10 PROCEDURE — 99417 PROLNG OP E/M EACH 15 MIN: CPT | Performed by: MEDICAL GENETICS

## 2024-04-10 PROCEDURE — 85025 COMPLETE CBC W/AUTO DIFF WBC: CPT | Performed by: MEDICAL GENETICS

## 2024-04-10 PROCEDURE — 84466 ASSAY OF TRANSFERRIN: CPT | Performed by: MEDICAL GENETICS

## 2024-04-10 PROCEDURE — G0463 HOSPITAL OUTPT CLINIC VISIT: HCPCS | Performed by: MEDICAL GENETICS

## 2024-04-10 NOTE — Clinical Note
4/10/2024      RE: Tory Stone  [address] [facility] [address]     Dear Colleague,    Thank you for the opportunity to participate in the care of your patient, Tory Stone, at the [facility]. Please see a copy of my visit note below.    No notes on file    Please do not hesitate to contact me if you have any questions/concerns.     Sincerely,       Diane Benton MD

## 2024-04-10 NOTE — LETTER
4/10/2024      RE: Tory Stone  [address] [place] [address]     Dear Colleague,    Thank you for the opportunity to participate in the care of your patient, Tory Stone, at the [facility] at [facility]. Please see a copy of my visit note below.    Adult Metabolism Clinic Return Visit  Name:  Tory Stone  :   1985  MRN:   [id number]  Date of Visit: Apr 10, 2024  PCP: Bony Malcolm  Managing Metabolic Center(s):  [facility].    Chief Complaint:  Mr. Tory Stone is a 38 year old male whom I saw in follow up in Metabolism Clinic for OTC (ornithine transcarbamylase deficiency) (H24).  Mr. Stone was accompanied to this visit by his   fiancee . He also saw our dietitian and nurse coordinator at this visit.     Assessment:    Mr. Stone has OTC deficiency that fortunately has been metabolically stable.  On his current dietary regimen, he has appropriate amino acid profile suggesting against the need for additional medications to provide nitrogen scabbing.  A normal Citrulline value suggests that he typically has adequate residual OTC activity to minimize the risk of sudden hyperammonemic events.  These events can occur with physical stress, however so additional continued precautions are merited including the use of an emergency letter.     Plan:    1. Testing ordered at this visit:   Orders Placed This Encounter   Procedures     DIET CONSULT COMPREHENSIVE     Amino acids plasma quantitative     Prealbumin     Transferrin     Vitamin D Deficiency     Carnitine free and total     Comprehensive metabolic panel     CBC with platelets and differential     CBC with Platelets & Differential   .    Results are as noted, below. Additional recommendations based on these laboratory results: Glutamine is normal suggesting against  elevated ammonia on a chronic basis.  Citrulline is in the normal range.  Other reiterated acids are in generally normal range suggesting adequate protein intake along with normal plasma protein profile.  2. Medications: Continue current regimen: Currently no specific medications referable to his condition are prescribed  3. Metabolic dietician follow up with Jennifer Oneil RD, LD at this visit to review special dietary concerns. Metabolic diet should be continued as prescribed.  See nutrition history, below. They discussed:  ---  Nutrition Prescription  Laurance to meet 100% estimated needs on low/moderate protein intake.    Nutrition Education:   Provided education on ongoing plan of nutritional care.     Reviewed/discussed goal of adequate but not excessive protein intake, maintaining goal intake around 50-60 gm/day.  Cautioned against high amounts of protein in one sitting such as the large chicken breast, but patient states this was rare and doesn't happen often.    Encouraged continuing to use high biological value sources of protein daily from meat, egg, dairy sources.  Use CIB to supplement intake as needed.  Continue daily MVI     Implementation:  Implementation: Collaboration with other providers - seen/discussed with metabolic provider/MD.    Goals  BMI: maintenance/WNL  Laurjayant will follow a low/moderate protein diet  Protein-status labs, plasma amino acids, glutamine/ammonia WNL  ---  4.   Continue to observe emergency precautions as previously discussed.  Our on-call metabolic service is available 24 hours/day by calling the page  ([phone]) and asking for the Genetics and Metabolism doctor on call.  5. Return to the Adult Metabolism Clinic in 12 months for follow-up.     6.  We will provide an updated emergency treatment plan for Tim  ----------  History of Present Illness:  Visit Diagnosis:  OTC (ornithine transcarbamylase deficiency) (H24)    Patient Active Problem List   Diagnosis  "    OTC (ornithine transcarbamylase deficiency) -     Need for immunization against influenza     Discussed at this visit:  Mr. Stone has been medically generally well since last being seen.  He did experience a rectal abscess in late December 2023 but this did not require surgical intervention and has since resolved.  He has not had any episodes that suggested metabolic decompensation or elevated ammonia.  He continues to maintain a general modest protein restriction.      Interval History:  Tory was last seen in our clinic on 12/28/2022.  Since the last clinic visit Toyr was not seen for any urgent clinic visits.  He was not seen in the emergency department.  He currently has a written emergency letter for this condition.  No hospitalizations occurred.  He had no general anesthesia and no surgical procedures.      Other health services currently received are primary care  Current research study participation: Longitudinal Study of Urea Cycle Disorders  .          Personal History:  Family History Update:   There was no new family history information elicited at this visit.    Lives with flora.  They enjoy having their pet chickens  Current insurance status state/federal program (Medicaid/Medicare).      I have reviewed Tory s past medical history, family history, social history, medications and allergies as documented in the patient's electronic medical record.  There were no additional findings except as noted.     Review of Systems:   Constitional: chronic fatigue  Eyes: negative -  Wears glasses as he is nearsighted   Ears/Nose/Throat: negative - normal hearing  Respiratory:  Tires easily with exertion and gets \"winded\"  Cardiovascular: negative  Gastrointestinal:  Has some problems with reflux treated with famotidine. Does not have constipation  Genitourinary:  Has had kidney stones that required lithotripsy; Has not had any recent incidents and pays close attention to good water " "intake  Hematologic/Lymphatic: negative  Allergy/Immunologic: negative - no drug allergies  Musculoskeletal: negative  Endocrine: negative  Integument: negative  Neurologic:  Has recurrent migraines; also notes some hypersensitivity to touch at certain times.   Psychiatric:  Anxiety and depression, medication has been helpful    Nutrition History:  Tory is on a low/moderate protein intake diet (goal 50-60 gm/day).     Typical oral intakes:     Patient notes his typical schedule as sleeping from late afternoon (3pm) until 9 pm/12am although this can vary.  He is awake mainly through the night, and then eats his \"breakfast\" but is more of dinner-type foods.                Breakfast:   Yesterday was large chicken breast (estimates 16 oz) with mashed potatoes ( gm pro?)  Other options: Skillet meals (Ex: [employer] Garlic chicken, Beef and Broccoli, Cheeseburger skillet (~10-20 gm protein)  Hot pockets (10-15 gm pro)  Hash browns  Snacks Overnight:  Pop tarts, fried egg, occasionally his roommate or girlfriend bring him a cheeseburger from [employer]  Will use [employer] Instant Breakfast about 1-2x a week when he feels his protein intake has been on the low side.     Food frequency:  High protein foods: Meat daily, egg few times a week  Calcium rich foods: likes cheese and crackers, has yogurt occasionally, only drinks milk with cookies     Overall patient feels he is very in tune with his body and protein needs, and he knows when he needs to eat more.  It is rare he feels unwell after too much protein but feels he would have a good sense on this too.     Special considerations:  Nutrition related medical updates:  No ER visits or illnesses related to concerns of hyperammonemia  Special diet:   Low/moderate protein  Vitamins/Supplements:   MVI daily     Total Nutrition Provisions:  Estimate intake is likely meeting minimum needs of 50 gm (0.7 g/kg) daily with daily meat intake.      Medications:  Current " "Outpatient Medications   Medication Sig Dispense Refill     famotidine (PEPCID) 40 MG tablet Take 40 mg by mouth 2 times daily       Multiple Vitamins-Minerals (MULTIVITAMIN PO) Take 1 tablet by mouth daily       sertraline (ZOLOFT) 100 MG tablet Take 1 tablet by mouth daily       Allergies:  Allergies   Allergen Reactions     Sulfa Antibiotics      Physical Examination:  Blood pressure 116/78, pulse 90, height 5' 10.28\" (178.5 cm), weight 164 lb 0.4 oz (74.4 kg), head circumference 57.5 cm (22.64\").  Body surface area is 1.92 meters squared.  BMI: Body mass index is 23.35 kg/m .  General: This was a well-developed, well-nourished male who responded appropriately to all requests during the examination.    Head and Neck:  He had hair of normal texture and distribution and his head was proportionate in appearance.The neck was supple without lymphadenopathy.    Eyes:  The pupils were equal, round, and reacted to light.   The conjunctivae were clear.   Ears:  His ears were normal in architecture and placement.   Nose: The nose was clear.    Mouth and Throat: his dentition was normal.  The throat was without erythema.    Respiratory: The chest was clear to auscultation and had a symmetric appearance.    CV:  On examination of the heart, the rhythm was regular and there was no murmur.  The peripheral pulses were normal.    GI: The abdomen was soft and had normal bowel sounds.  There was no hepatosplenomegaly.    : I deferred a  examination.   Musculoskeletal: There was a full range of motion on the extremity exam, and normal muscular volume and bulk.   Neurologic: The neurologic exam was normal, with normal cranial nerves, normal deep tendon reflexes, normal sensation, and a normal gait. He had normal tone.   Integument: The skin was normal with no rashes or unusual pigmentation.    Results of laboratory studies collected at this visit and available when this note was completed:   Results for orders placed or " performed in visit on 04/10/24   Amino acids plasma quantitative     Status: Abnormal   Result Value Ref Range    A-Aminoadipic 0 0 - 6 umol/dL    Alanine 40 22 - 62 umol/dL    Anserine 0 umol/dL    Arginine 5 2 - 18 umol/dL    Asparagine 3 1 - 5 umol/dL    Aspartic Acid <1 0 - 4 umol/dL    B-Alanine Plasma 0 umol/dL    B-Aminoisobutyric 0 umol/dL    Carnosine 0 umol/dL    Citrulline 1.6 1.3 - 6.0 umol/dL    Cystathionine 0 umol/dL    Cystine 7 3 - 15 umol/dL    Glutamic Acid 5 0 - 16 umol/dL    Glutamine 56 41 - 86 umol/dL    Glycine 19 13 - 50 umol/dL    Histidine 8 3 - 15 umol/dL    1-Methylhistidine 3 (H) 0 - 2 umol/dL    3-Methylhistidine 1 0 - 1 umol/dL    Homocysteine umol/dL 0 umol/dL    Hydroxylysine 0 umol/dL    Hydroxyproline 1 0 - 3 umol/dL    Isoleucine 4 4 - 11 umol/dL    Leucine 10 8 - 21 umol/dL    Lysine 18 6 - 26 umol/dL    Methionine 2 1 - 6 umol/dL    Ornithine 3 2 - 16 umol/dL    Phenylalanine umol/dL 4.2 3.0 - 10.0 umol/dL    Proline 20 0 - 48 umol/dL    Sarcosine Plasma 0 umol/dL    Serine 6 4 - 18 umol/dL    Taurine 9 7 - 32 umol/dL    Threonine 9 5 - 25 umol/dL    Tyrosine umol/dL 4.9 4.0 - 13.0 umol/dL    Valine 21 8 - 46 umol/dL    Amino Acid Plasma Interpretation       INTERPRETATION IS NOT INDICATED FOR THIS SPECIMEN     Prealbumin     Status: Normal   Result Value Ref Range    Prealbumin 33.2 20.0 - 40.0 mg/dL   Transferrin     Status: Normal   Result Value Ref Range    Transferrin 331.0 200.0 - 360.0 mg/dL   Vitamin D Deficiency     Status: Normal   Result Value Ref Range    Vitamin D, Total (25-Hydroxy) 36 20 - 50 ng/mL    Narrative    Season, race, dietary intake, and treatment affect the concentration of 25-hydroxy-Vitamin D. Values may decrease during winter months and increase during summer months.    Vitamin D determination is routinely performed by an immunoassay specific for 25 hydroxyvitamin D3.  If an individual is on vitamin D2(ergocalciferol) supplementation, please specify  25 OH vitamin D2 and D3 level determination by LCMSMS test VITD23.     Carnitine free and total     Status: None   Result Value Ref Range    Carnitine Free 45 25 - 60 umol/L    Carnitine Total 53 34 - 86 umol/L    Carnitine Esterified 8 5 - 29 umol/L    Carnitine Esterified/Free Ratio 0.2 0.1 - 1.0   Comprehensive metabolic panel     Status: Normal   Result Value Ref Range    Sodium 136 135 - 145 mmol/L    Potassium 4.3 3.4 - 5.3 mmol/L    Carbon Dioxide (CO2) 23 22 - 29 mmol/L    Anion Gap 13 7 - 15 mmol/L    Urea Nitrogen 11.7 6.0 - 20.0 mg/dL    Creatinine 0.89 0.67 - 1.17 mg/dL    GFR Estimate >90 >60 mL/min/1.73m2    Calcium 9.6 8.6 - 10.0 mg/dL    Chloride 100 98 - 107 mmol/L    Glucose 77 70 - 99 mg/dL    Alkaline Phosphatase 127 40 - 150 U/L    AST 24 0 - 45 U/L    ALT 53 0 - 70 U/L    Protein Total 8.2 6.4 - 8.3 g/dL    Albumin 5.0 3.5 - 5.2 g/dL    Bilirubin Total 0.6 <=1.2 mg/dL   CBC with platelets and differential     Status: None   Result Value Ref Range    WBC Count 8.9 4.0 - 11.0 10e3/uL    RBC Count 5.17 4.40 - 5.90 10e6/uL    Hemoglobin 14.5 13.3 - 17.7 g/dL    Hematocrit 43.8 40.0 - 53.0 %    MCV 85 78 - 100 fL    MCH 28.0 26.5 - 33.0 pg    MCHC 33.1 31.5 - 36.5 g/dL    RDW 13.5 10.0 - 15.0 %    Platelet Count 431 150 - 450 10e3/uL    % Neutrophils 50 %    % Lymphocytes 37 %    % Monocytes 7 %    % Eosinophils 5 %    % Basophils 1 %    % Immature Granulocytes 0 %    NRBCs per 100 WBC 0 <1 /100    Absolute Neutrophils 4.4 1.6 - 8.3 10e3/uL    Absolute Lymphocytes 3.3 0.8 - 5.3 10e3/uL    Absolute Monocytes 0.6 0.0 - 1.3 10e3/uL    Absolute Eosinophils 0.5 0.0 - 0.7 10e3/uL    Absolute Basophils 0.1 0.0 - 0.2 10e3/uL    Absolute Immature Granulocytes 0.0 <=0.4 10e3/uL    Absolute NRBCs 0.0 10e3/uL   CBC with Platelets & Differential     Status: None    Narrative    The following orders were created for panel order CBC with Platelets & Differential.  Procedure                                Abnormality         Status                     ---------                               -----------         ------                     CBC with platelets and d...[[id number]]                      Final result                 Please view results for these tests on the individual orders.       DIANE HERNANDEZ M.D., FAAP, [facility]  Professor  Division of Genetics and Metabolism  Department of Pediatrics  [facility]    Routed to patient in Comm Mgt  Also to Bony Malcolm    60 minutes spent on the date of the encounter doing chart review, history and exam, documentation and further activities per the note  The longitudinal plan of care for the diagnosis(es)/condition(s) as documented were addressed during this visit. Due to the added complexity in care, I will continue to support Laurance in the subsequent management and with ongoing continuity of care.        Please do not hesitate to contact me if you have any questions/concerns.     Sincerely,       Diane Hernandez MD

## 2024-04-10 NOTE — PROGRESS NOTES
Adult Metabolism Clinic Return Visit  Name:  Tory Stone  :   1985  MRN:   [id number]  Date of Visit: Apr 10, 2024  PCP: Bony Malcolm  Managing Metabolic Center(s):  [facility].    Chief Complaint:  Mr. Tory Stone is a 38 year old male whom I saw in follow up in Metabolism Clinic for OTC (ornithine transcarbamylase deficiency) (H24).  Mr. Stone was accompanied to this visit by his   fiancee . He also saw our dietitian and nurse coordinator at this visit.     Assessment:    Mr. Stone has OTC deficiency that fortunately has been metabolically stable.  On his current dietary regimen, he has appropriate amino acid profile suggesting against the need for additional medications to provide nitrogen scabbing.  A normal Citrulline value suggests that he typically has adequate residual OTC activity to minimize the risk of sudden hyperammonemic events.  These events can occur with physical stress, however so additional continued precautions are merited including the use of an emergency letter.     Plan:    1. Testing ordered at this visit:   Orders Placed This Encounter   Procedures    DIET CONSULT COMPREHENSIVE    Amino acids plasma quantitative    Prealbumin    Transferrin    Vitamin D Deficiency    Carnitine free and total    Comprehensive metabolic panel    CBC with platelets and differential    CBC with Platelets & Differential   .    Results are as noted, below. Additional recommendations based on these laboratory results: Glutamine is normal suggesting against elevated ammonia on a chronic basis.  Citrulline is in the normal range.  Other reiterated acids are in generally normal range suggesting adequate protein intake along with normal plasma protein profile.  2. Medications: Continue current regimen: Currently no specific medications referable to his condition are prescribed  3. Metabolic dietician follow up with Jennifer  JANIE Oneil, LD at this visit to review special dietary concerns. Metabolic diet should be continued as prescribed.  See nutrition history, below. They discussed:  ---  Nutrition Prescription  Laurance to meet 100% estimated needs on low/moderate protein intake.    Nutrition Education:   Provided education on ongoing plan of nutritional care.     Reviewed/discussed goal of adequate but not excessive protein intake, maintaining goal intake around 50-60 gm/day.  Cautioned against high amounts of protein in one sitting such as the large chicken breast, but patient states this was rare and doesn't happen often.    Encouraged continuing to use high biological value sources of protein daily from meat, egg, dairy sources.  Use CIB to supplement intake as needed.  Continue daily MVI     Implementation:  Implementation: Collaboration with other providers - seen/discussed with metabolic provider/MD.    Goals  BMI: maintenance/WNL  Laurance will follow a low/moderate protein diet  Protein-status labs, plasma amino acids, glutamine/ammonia WNL  ---  4.   Continue to observe emergency precautions as previously discussed.  Our on-call metabolic service is available 24 hours/day by calling the page  ([phone]) and asking for the Genetics and Metabolism doctor on call.  5. Return to the Adult Metabolism Clinic in 12 months for follow-up.     6.  We will provide an updated emergency treatment plan for Tim  ----------  History of Present Illness:  Visit Diagnosis:  OTC (ornithine transcarbamylase deficiency) (H24)    Patient Active Problem List   Diagnosis    OTC (ornithine transcarbamylase deficiency) -    Need for immunization against influenza     Discussed at this visit:  Mr. Stone has been medically generally well since last being seen.  He did experience a rectal abscess in late December 2023 but this did not require surgical intervention and has since resolved.  He has not had any episodes that suggested  "metabolic decompensation or elevated ammonia.  He continues to maintain a general modest protein restriction.      Interval History:  Tory was last seen in our clinic on 12/28/2022.  Since the last clinic visit Tory was not seen for any urgent clinic visits.  He was not seen in the emergency department.  He currently has a written emergency letter for this condition.  No hospitalizations occurred.  He had no general anesthesia and no surgical procedures.      Other health services currently received are primary care  Current research study participation: Longitudinal Study of Urea Cycle Disorders  .          Personal History:  Family History Update:   There was no new family history information elicited at this visit.    Lives with flora.  They enjoy having their pet chickens  Current insurance status state/federal program (Medicaid/Medicare).      I have reviewed Tory s past medical history, family history, social history, medications and allergies as documented in the patient's electronic medical record.  There were no additional findings except as noted.     Review of Systems:   Constitional: chronic fatigue  Eyes: negative -  Wears glasses as he is nearsighted   Ears/Nose/Throat: negative - normal hearing  Respiratory:  Tires easily with exertion and gets \"winded\"  Cardiovascular: negative  Gastrointestinal:  Has some problems with reflux treated with famotidine. Does not have constipation  Genitourinary:  Has had kidney stones that required lithotripsy; Has not had any recent incidents and pays close attention to good water intake  Hematologic/Lymphatic: negative  Allergy/Immunologic: negative - no drug allergies  Musculoskeletal: negative  Endocrine: negative  Integument: negative  Neurologic:  Has recurrent migraines; also notes some hypersensitivity to touch at certain times.   Psychiatric:  Anxiety and depression, medication has been helpful    Nutrition History:  Tory is on a low/moderate " "protein intake diet (goal 50-60 gm/day).     Typical oral intakes:     Patient notes his typical schedule as sleeping from late afternoon (3pm) until 9 pm/12am although this can vary.  He is awake mainly through the night, and then eats his \"breakfast\" but is more of dinner-type foods.                Breakfast:   Yesterday was large chicken breast (estimates 16 oz) with mashed potatoes ( gm pro?)  Other options: Skillet meals (Ex: [employer] Vo[employer]! Garlic chicken, Beef and Broccoli, Cheeseburger skillet (~10-20 gm protein)  Hot pockets (10-15 gm pro)  Hash browns  Snacks Overnight:  Pop tarts, fried egg, occasionally his roommate or girlfriend bring him a cheeseburger from [employer]  Will use [place] Instant Breakfast about 1-2x a week when he feels his protein intake has been on the low side.     Food frequency:  High protein foods: Meat daily, egg few times a week  Calcium rich foods: likes cheese and crackers, has yogurt occasionally, only drinks milk with cookies     Overall patient feels he is very in tune with his body and protein needs, and he knows when he needs to eat more.  It is rare he feels unwell after too much protein but feels he would have a good sense on this too.     Special considerations:  Nutrition related medical updates:  No ER visits or illnesses related to concerns of hyperammonemia  Special diet:   Low/moderate protein  Vitamins/Supplements:   MVI daily     Total Nutrition Provisions:  Estimate intake is likely meeting minimum needs of 50 gm (0.7 g/kg) daily with daily meat intake.      Medications:  Current Outpatient Medications   Medication Sig Dispense Refill    famotidine (PEPCID) 40 MG tablet Take 40 mg by mouth 2 times daily      Multiple Vitamins-Minerals (MULTIVITAMIN PO) Take 1 tablet by mouth daily      sertraline (ZOLOFT) 100 MG tablet Take 1 tablet by mouth daily       Allergies:  Allergies   Allergen Reactions    Sulfa Antibiotics      Physical Examination:  Blood " "pressure 116/78, pulse 90, height 5' 10.28\" (178.5 cm), weight 164 lb 0.4 oz (74.4 kg), head circumference 57.5 cm (22.64\").  Body surface area is 1.92 meters squared.  BMI: Body mass index is 23.35 kg/m .  General: This was a well-developed, well-nourished male who responded appropriately to all requests during the examination.    Head and Neck:  He had hair of normal texture and distribution and his head was proportionate in appearance.The neck was supple without lymphadenopathy.    Eyes:  The pupils were equal, round, and reacted to light.   The conjunctivae were clear.   Ears:  His ears were normal in architecture and placement.   Nose: The nose was clear.    Mouth and Throat: his dentition was normal.  The throat was without erythema.    Respiratory: The chest was clear to auscultation and had a symmetric appearance.    CV:  On examination of the heart, the rhythm was regular and there was no murmur.  The peripheral pulses were normal.    GI: The abdomen was soft and had normal bowel sounds.  There was no hepatosplenomegaly.    : I deferred a  examination.   Musculoskeletal: There was a full range of motion on the extremity exam, and normal muscular volume and bulk.   Neurologic: The neurologic exam was normal, with normal cranial nerves, normal deep tendon reflexes, normal sensation, and a normal gait. He had normal tone.   Integument: The skin was normal with no rashes or unusual pigmentation.    Results of laboratory studies collected at this visit and available when this note was completed:   Results for orders placed or performed in visit on 04/10/24   Amino acids plasma quantitative     Status: Abnormal   Result Value Ref Range    A-Aminoadipic 0 0 - 6 umol/dL    Alanine 40 22 - 62 umol/dL    Anserine 0 umol/dL    Arginine 5 2 - 18 umol/dL    Asparagine 3 1 - 5 umol/dL    Aspartic Acid <1 0 - 4 umol/dL    B-Alanine Plasma 0 umol/dL    B-Aminoisobutyric 0 umol/dL    Carnosine 0 umol/dL    Citrulline " 1.6 1.3 - 6.0 umol/dL    Cystathionine 0 umol/dL    Cystine 7 3 - 15 umol/dL    Glutamic Acid 5 0 - 16 umol/dL    Glutamine 56 41 - 86 umol/dL    Glycine 19 13 - 50 umol/dL    Histidine 8 3 - 15 umol/dL    1-Methylhistidine 3 (H) 0 - 2 umol/dL    3-Methylhistidine 1 0 - 1 umol/dL    Homocysteine umol/dL 0 umol/dL    Hydroxylysine 0 umol/dL    Hydroxyproline 1 0 - 3 umol/dL    Isoleucine 4 4 - 11 umol/dL    Leucine 10 8 - 21 umol/dL    Lysine 18 6 - 26 umol/dL    Methionine 2 1 - 6 umol/dL    Ornithine 3 2 - 16 umol/dL    Phenylalanine umol/dL 4.2 3.0 - 10.0 umol/dL    Proline 20 0 - 48 umol/dL    Sarcosine Plasma 0 umol/dL    Serine 6 4 - 18 umol/dL    Taurine 9 7 - 32 umol/dL    Threonine 9 5 - 25 umol/dL    Tyrosine umol/dL 4.9 4.0 - 13.0 umol/dL    Valine 21 8 - 46 umol/dL    Amino Acid Plasma Interpretation       INTERPRETATION IS NOT INDICATED FOR THIS SPECIMEN     Prealbumin     Status: Normal   Result Value Ref Range    Prealbumin 33.2 20.0 - 40.0 mg/dL   Transferrin     Status: Normal   Result Value Ref Range    Transferrin 331.0 200.0 - 360.0 mg/dL   Vitamin D Deficiency     Status: Normal   Result Value Ref Range    Vitamin D, Total (25-Hydroxy) 36 20 - 50 ng/mL    Narrative    Season, race, dietary intake, and treatment affect the concentration of 25-hydroxy-Vitamin D. Values may decrease during winter months and increase during summer months.    Vitamin D determination is routinely performed by an immunoassay specific for 25 hydroxyvitamin D3.  If an individual is on vitamin D2(ergocalciferol) supplementation, please specify 25 OH vitamin D2 and D3 level determination by LCMSMS test VITD23.     Carnitine free and total     Status: None   Result Value Ref Range    Carnitine Free 45 25 - 60 umol/L    Carnitine Total 53 34 - 86 umol/L    Carnitine Esterified 8 5 - 29 umol/L    Carnitine Esterified/Free Ratio 0.2 0.1 - 1.0   Comprehensive metabolic panel     Status: Normal   Result Value Ref Range    Sodium  136 135 - 145 mmol/L    Potassium 4.3 3.4 - 5.3 mmol/L    Carbon Dioxide (CO2) 23 22 - 29 mmol/L    Anion Gap 13 7 - 15 mmol/L    Urea Nitrogen 11.7 6.0 - 20.0 mg/dL    Creatinine 0.89 0.67 - 1.17 mg/dL    GFR Estimate >90 >60 mL/min/1.73m2    Calcium 9.6 8.6 - 10.0 mg/dL    Chloride 100 98 - 107 mmol/L    Glucose 77 70 - 99 mg/dL    Alkaline Phosphatase 127 40 - 150 U/L    AST 24 0 - 45 U/L    ALT 53 0 - 70 U/L    Protein Total 8.2 6.4 - 8.3 g/dL    Albumin 5.0 3.5 - 5.2 g/dL    Bilirubin Total 0.6 <=1.2 mg/dL   CBC with platelets and differential     Status: None   Result Value Ref Range    WBC Count 8.9 4.0 - 11.0 10e3/uL    RBC Count 5.17 4.40 - 5.90 10e6/uL    Hemoglobin 14.5 13.3 - 17.7 g/dL    Hematocrit 43.8 40.0 - 53.0 %    MCV 85 78 - 100 fL    MCH 28.0 26.5 - 33.0 pg    MCHC 33.1 31.5 - 36.5 g/dL    RDW 13.5 10.0 - 15.0 %    Platelet Count 431 150 - 450 10e3/uL    % Neutrophils 50 %    % Lymphocytes 37 %    % Monocytes 7 %    % Eosinophils 5 %    % Basophils 1 %    % Immature Granulocytes 0 %    NRBCs per 100 WBC 0 <1 /100    Absolute Neutrophils 4.4 1.6 - 8.3 10e3/uL    Absolute Lymphocytes 3.3 0.8 - 5.3 10e3/uL    Absolute Monocytes 0.6 0.0 - 1.3 10e3/uL    Absolute Eosinophils 0.5 0.0 - 0.7 10e3/uL    Absolute Basophils 0.1 0.0 - 0.2 10e3/uL    Absolute Immature Granulocytes 0.0 <=0.4 10e3/uL    Absolute NRBCs 0.0 10e3/uL   CBC with Platelets & Differential     Status: None    Narrative    The following orders were created for panel order CBC with Platelets & Differential.  Procedure                               Abnormality         Status                     ---------                               -----------         ------                     CBC with platelets and d...[[id number]]                      Final result                 Please view results for these tests on the individual orders.       CHAN HERNANDEZ M.D., FAAP, [facility]  Professor  Division of Genetics and Metabolism  Department of  Pediatrics  [facility]    Routed to patient in Comm Mgt  Also to Bony Malcolm    60 minutes spent on the date of the encounter doing chart review, history and exam, documentation and further activities per the note  The longitudinal plan of care for the diagnosis(es)/condition(s) as documented were addressed during this visit. Due to the added complexity in care, I will continue to support Tory in the subsequent management and with ongoing continuity of care.

## 2024-04-10 NOTE — PATIENT INSTRUCTIONS
"Pediatric Metabolism/PKU Clinic  [facility]  Pediatric Specialty Clinic ([facility])      Formula   We did not make any changes to your formula today.   We will review the lab results and call you with our recommendations.  *Do not make any formula changes without first speaking to your dietician or doctor.       Medications   We did not make any changes to your medications today. We will review the lab results and call you with our recommendations.  *Do not make any medication changes without first speaking to your doctor.  **Please contact us at least one week in advance during regular business hours if you are about to run out of formula or medication       Emergency & Sick Calls   Keep your emergency letter with your child at all times  (at their school, in your vehicles, purse/bag and home, etc).  Consider making a medical alert bracelet.    If your child is unresponsive or has other life threatening medical emergency YOU SHOULD CALL 911.     If your child is NOT ACTING NORMALLY such as: confused or sleepier than normal, having nausea or vomiting, not tolerating their formula or medications, breathing faster than normal, has a fever, diarrhea, or other parental concern CALL US IMMEDIATELY.     Call [phone] and ask the  to \"page Genetic Metabolic Physician on-call\"   If no one calls you back within 15 minutes call again.        Helpful Numbers   To schedule appointments:  Pediatric Call Center for [facility]: [phone]  Neuropsychology [phone]   Radiology/ Imaging/ Echocardiogram: [phone]   Services:   [phone]     For questions about medications/ supplies or non-urgent medical concerns:        Natty PATRICK, RN, PHN  Nurse Care Coordinator               Ph: [phone]        Khushboo Gipson APRN, CNP             Ph: [phone]    For questions about your child's nutrition:  Jennifer Oneil RD  Ph: [phone]        "       If you have not already done so consider signing up for [employer] by speaking with the person at the  on your way out or go to [email or web address] to sign up online.      You should receive a phone call about your next appointment. If you do not receive this within two weeks of your visit, please call [phone].

## 2024-04-11 LAB
(HCYS)2 SERPL-SCNC: 0 UMOL/DL
1ME-HIST SERPL-SCNC: 3 UMOL/DL (ref 0–2)
3ME-HISTIDINE SERPL-SCNC: 1 UMOL/DL (ref 0–1)
AAA SERPL-SCNC: 0 UMOL/DL (ref 0–6)
ALANINE SERPL-SCNC: 0 UMOL/DL
ALANINE SFR SERPL: 40 UMOL/DL (ref 22–62)
AMINO ACID PAT SERPL-IMP: ABNORMAL
ANSERINE SERPL-SCNC: 0 UMOL/DL
ARGININE SERPL-SCNC: 5 UMOL/DL (ref 2–18)
ASPARAGINE SERPL-SCNC: 3 UMOL/DL (ref 1–5)
ASPARTATE SERPL-SCNC: <1 UMOL/DL (ref 0–4)
B-AIB SERPL-SCNC: 0 UMOL/DL
CARNOSINE SERPL-SCNC: 0 UMOL/DL
CITRULLINE SERPL-SCNC: 1.6 UMOL/DL (ref 1.3–6)
CYSTATHIONIN SERPL-SCNC: 0 UMOL/DL
CYSTINE SERPL-SCNC: 7 UMOL/DL (ref 3–15)
GLUTAMATE SERPL-SCNC: 5 UMOL/DL (ref 0–16)
GLUTAMATE SERPL-SCNC: 56 UMOL/DL (ref 41–86)
GLYCINE SERPL-SCNC: 19 UMOL/DL (ref 13–50)
HISTIDINE SERPL-SCNC: 8 UMOL/DL (ref 3–15)
ISOLEUCINE SERPL-SCNC: 4 UMOL/DL (ref 4–11)
LEUCINE SERPL-SCNC: 10 UMOL/DL (ref 8–21)
LYSINE SERPL-SCNC: 18 UMOL/DL (ref 6–26)
METHIONINE SERPL-SCNC: 2 UMOL/DL (ref 1–6)
OH-LYSINE SERPL-SCNC: 0 UMOL/DL
OH-PROLINE SERPL-SCNC: 1 UMOL/DL (ref 0–3)
ORNITHINE SERPL-SCNC: 3 UMOL/DL (ref 2–16)
PHE SERPL-SCNC: 4.2 UMOL/DL (ref 3–10)
PROLINE SERPL-SCNC: 20 UMOL/DL (ref 0–48)
SARCOSINE SERPL-SCNC: 0 UMOL/DL
SERINE SERPL-SCNC: 6 UMOL/DL (ref 4–18)
TAURINE SERPL-SCNC: 9 UMOL/DL (ref 7–32)
THREONINE SERPL-SCNC: 9 UMOL/DL (ref 5–25)
TYROSINE SERPL-SCNC: 4.9 UMOL/DL (ref 4–13)
VALINE SERPL-SCNC: 21 UMOL/DL (ref 8–46)

## 2024-04-11 NOTE — PROGRESS NOTES
"..CLINICAL NUTRITION SERVICES - ASSESSMENT NOTE    REASON FOR ASSESSMENT  Tory Stone is a 38 year old male seen by the dietitian in metabolic clinic for OTC deficiency.     RECOMMENDATIONS    Goal protein intake 50-60 gm/day (0.7-0.8 g/kg)  Include high biological value protein sources in intake (meat/dairy)  Continue daily MVI    To schedule future appointment call [phone].        ANTHROPOMETRICS  Height: 178.5 cm or 70.3 in  Weight: 74.4 kg or 164 lbs  BMI: 23.4    Comments:  Weight: patient reports weight as stable    NUTRITION HISTORY  Tory is on a low/moderate protein intake diet (goal 50-60 gm/day).    Typical oral intakes:    Patient notes his typical schedule as sleeping from late afternoon (3pm) until 9 pm/12am although this can vary.  He is awake mainly through the night, and then eats his \"breakfast\" but is more of dinner-type foods.     Breakfast:   Yesterday was large chicken breast (estimates 16 oz) with mashed potatoes ( gm pro?)  Other options: Skillet meals (Ex: [employer]! Garlic chicken, Beef and Broccoli, Cheeseburger skillet (~10-20 gm protein)  Hot pockets (10-15 gm pro)  Hash browns  Snacks Overnight:  Pop tarts, fried egg, occasionally his roommate or girlfriend bring him a cheeseburger from [employer]  Will use [place] Instant Breakfast about 1-2x a week when he feels his protein intake has been on the low side.    Food frequency:  High protein foods: Meat daily, egg few times a week  Calcium rich foods: likes cheese and crackers, has yogurt occasionally, only drinks milk with cookies    Overall patient feels he is very in tune with his body and protein needs, and he knows when he needs to eat more.  It is rare he feels unwell after too much protein but feels he would have a good sense on this too.    Special considerations:  Nutrition related medical updates:  No ER visits or illnesses related to concerns of hyperammonemia  Special diet:   Low/moderate " protein  Vitamins/Supplements:   MVI daily    Total Nutrition Provisions:  Estimate intake is likely meeting minimum needs of 50 gm (0.7 g/kg) daily with daily meat intake.      NUTRITION RELATED PHYSICAL FINDINGS  None    NUTRITION RELATED LABS  Labs reviewed  -Pending    NUTRITION RELATED MEDICATIONS  Medications reviewed    ESTIMATED NUTRITION NEEDS:  Based on [place] (1650) x 1.2-1.3  Energy Needs: 27-29 kcal/kg  Protein Needs: RDA for age = 0.8 g/kg, range for age/UCD: 0.5-1 g/kg  Micronutrient Needs: RDA for age: 15 mcg Vitamin D    NUTRITION DIAGNOSIS  Impaired nutrient utilization related to diagnosis of OTC deficiency as evidenced by risk of hyperammonemia with stress/illness, prolonged catabolism, or very high protein intake.    INTERVENTIONS  Nutrition Prescription  Laurance to meet 100% estimated needs on low/moderate protein intake.    Nutrition Education:   Provided education on ongoing plan of nutritional care.    Reviewed/discussed goal of adequate but not excessive protein intake, maintaining goal intake around 50-60 gm/day.  Cautioned against high amounts of protein in one sitting such as the large chicken breast, but patient states this was rare and doesn't happen often.    Encouraged continuing to use high biological value sources of protein daily from meat, egg, dairy sources.  Use CIB to supplement intake as needed.  Continue daily MVI    Implementation:  Implementation: Collaboration with other providers - seen/discussed with metabolic provider/MD.    Goals  BMI: maintenance/WNL  Tory will follow a low/moderate protein diet  Protein-status labs, plasma amino acids, glutamine/ammonia WNL    FOLLOW UP/MONITORING  Energy Intake  Macronutrient intake  Anthropometric measurements     Spent 15 minutes in consult with Tory Oneil, JANIE, LD

## 2024-04-13 LAB
ACYLCARNITINE SERPL-SCNC: 8 UMOL/L
CARN ESTERS/C0 SERPL-SRTO: 0.2 {RATIO}
CARNITINE FREE SERPL-SCNC: 45 UMOL/L
CARNITINE SERPL-SCNC: 53 UMOL/L

## 2024-06-09 ENCOUNTER — HEALTH MAINTENANCE LETTER (OUTPATIENT)
Age: 39
End: 2024-06-09

## 2025-04-02 ENCOUNTER — TELEPHONE (OUTPATIENT)
Dept: PEDIATRICS | Facility: CLINIC | Age: 40
End: 2025-04-02
Payer: COMMERCIAL

## 2025-04-02 NOTE — TELEPHONE ENCOUNTER
Left message for patient to call back to schedule Metabolism follow-up with Dr. Diane Benton. Call center number provided for scheduling.

## 2025-06-15 ENCOUNTER — HEALTH MAINTENANCE LETTER (OUTPATIENT)
Age: 40
End: 2025-06-15